# Patient Record
Sex: FEMALE | Race: AMERICAN INDIAN OR ALASKA NATIVE | NOT HISPANIC OR LATINO | Employment: UNEMPLOYED | URBAN - METROPOLITAN AREA
[De-identification: names, ages, dates, MRNs, and addresses within clinical notes are randomized per-mention and may not be internally consistent; named-entity substitution may affect disease eponyms.]

---

## 2022-08-04 LAB
ABO + RH BLD: NORMAL
C TRACH RRNA SPEC QL PROBE: NEGATIVE
GLUCOSE SERPL-MCNC: 170 MG/DL
HBV SURFACE AG SERPL QL IA: NEGATIVE
HCT VFR BLD AUTO: 35.4 % (ref 36–46)
HEMOGLOBIN BANDS: NORMAL
HGB BLD-MCNC: 11.8 G/DL (ref 12–16)
HIV 1+2 AB+HIV1 P24 AG SERPL QL IA: NORMAL
INDIRECT COOMBS: NEGATIVE
MCV RBC AUTO: 95 FL (ref 82–108)
N GONORRHOEAE, AMPLIFIED DNA: NEGATIVE
PLATELET # BLD AUTO: 177 K/UL (ref 150–399)
RPR: NORMAL
RUBELLA IMMUNE STATUS: NORMAL
URINE CULTURE, ROUTINE: NO GROWTH

## 2022-08-10 DIAGNOSIS — Z34.00 SUPERVISION OF NORMAL FIRST PREGNANCY, ANTEPARTUM: Primary | ICD-10-CM

## 2022-08-22 ENCOUNTER — PROCEDURE VISIT (OUTPATIENT)
Dept: MATERNAL FETAL MEDICINE | Facility: CLINIC | Age: 37
End: 2022-08-22
Payer: MEDICAID

## 2022-08-22 DIAGNOSIS — Z34.00 SUPERVISION OF NORMAL FIRST PREGNANCY, ANTEPARTUM: ICD-10-CM

## 2022-08-22 DIAGNOSIS — Z36.89 ENCOUNTER FOR FETAL ANATOMIC SURVEY: Primary | ICD-10-CM

## 2022-08-22 PROCEDURE — 76801 OB US < 14 WKS SINGLE FETUS: CPT | Mod: PBBFAC | Performed by: OBSTETRICS & GYNECOLOGY

## 2022-08-22 PROCEDURE — 76801 US MFM PROCEDURE (VIEWPOINT): ICD-10-PCS | Mod: 26,S$PBB,, | Performed by: OBSTETRICS & GYNECOLOGY

## 2022-08-25 LAB
GLUCOSE, 1 HOUR: 198 MG/DL
GLUCOSE, 2 HOUR: 153 MG/DL
GLUCOSE, 3 HOUR: 120 MG/DL
GLUCOSE, FASTING: 96 MG/DL

## 2022-09-19 ENCOUNTER — INITIAL PRENATAL (OUTPATIENT)
Dept: OBSTETRICS AND GYNECOLOGY | Facility: CLINIC | Age: 37
End: 2022-09-19
Payer: MEDICAID

## 2022-09-19 VITALS — WEIGHT: 196 LBS | HEART RATE: 95 BPM | DIASTOLIC BLOOD PRESSURE: 65 MMHG | SYSTOLIC BLOOD PRESSURE: 107 MMHG

## 2022-09-19 DIAGNOSIS — O09.512 PRIMIGRAVIDA OF ADVANCED MATERNAL AGE IN SECOND TRIMESTER: ICD-10-CM

## 2022-09-19 DIAGNOSIS — O24.410 DIET CONTROLLED GESTATIONAL DIABETES MELLITUS (GDM) IN SECOND TRIMESTER: ICD-10-CM

## 2022-09-19 PROBLEM — O09.519 ADVANCED MATERNAL AGE, PRIMIGRAVIDA, ANTEPARTUM: Status: ACTIVE | Noted: 2022-09-19

## 2022-09-19 PROBLEM — Z28.39 RUBELLA NON-IMMUNE STATUS, ANTEPARTUM: Status: ACTIVE | Noted: 2022-09-19

## 2022-09-19 PROBLEM — O09.899 RUBELLA NON-IMMUNE STATUS, ANTEPARTUM: Status: ACTIVE | Noted: 2022-09-19

## 2022-09-19 PROCEDURE — 99999 PR PBB SHADOW E&M-EST. PATIENT-LVL III: CPT | Mod: PBBFAC,,, | Performed by: OBSTETRICS & GYNECOLOGY

## 2022-09-19 PROCEDURE — 99213 OFFICE O/P EST LOW 20 MIN: CPT | Mod: PBBFAC,TH,PN | Performed by: OBSTETRICS & GYNECOLOGY

## 2022-09-19 PROCEDURE — 99204 PR OFFICE/OUTPT VISIT, NEW, LEVL IV, 45-59 MIN: ICD-10-PCS | Mod: TH,S$PBB,, | Performed by: OBSTETRICS & GYNECOLOGY

## 2022-09-19 PROCEDURE — 99999 PR PBB SHADOW E&M-EST. PATIENT-LVL III: ICD-10-PCS | Mod: PBBFAC,,, | Performed by: OBSTETRICS & GYNECOLOGY

## 2022-09-19 PROCEDURE — 99204 OFFICE O/P NEW MOD 45 MIN: CPT | Mod: TH,S$PBB,, | Performed by: OBSTETRICS & GYNECOLOGY

## 2022-09-19 RX ORDER — BLOOD-GLUCOSE METER
EACH MISCELLANEOUS 4 TIMES DAILY
Status: ON HOLD | COMMUNITY
Start: 2022-08-31 | End: 2023-02-28

## 2022-09-19 RX ORDER — ISOPROPYL ALCOHOL 0.75 G/1
SWAB TOPICAL
Status: ON HOLD | COMMUNITY
Start: 2022-08-31 | End: 2023-02-28

## 2022-09-19 RX ORDER — ONDANSETRON 8 MG/1
TABLET, ORALLY DISINTEGRATING ORAL
Status: ON HOLD | COMMUNITY
Start: 2022-08-04 | End: 2023-02-28

## 2022-09-19 RX ORDER — CALCIUM CARB/VITAMIN D3/VIT K1 500-500-40
TABLET,CHEWABLE ORAL 4 TIMES DAILY
Status: ON HOLD | COMMUNITY
Start: 2022-08-31 | End: 2023-02-28

## 2022-09-19 RX ORDER — LORATADINE 10 MG/1
10 TABLET ORAL DAILY
COMMUNITY

## 2022-09-19 RX ORDER — CALCIUM CITRATE/VITAMIN D3 200MG-6.25
TABLET ORAL 4 TIMES DAILY
Status: ON HOLD | COMMUNITY
Start: 2022-08-31 | End: 2023-02-28

## 2022-09-19 NOTE — PROGRESS NOTES
Pregnancy dating, labs, ultrasound reports, prenatal testing, and problem list; prior records and results; and available outside records were reviewed and updated in EMR.  Pertinent findings were noted below.    Reason for Visit: Initial Prenatal Visit    15w5d by Estimated Date of Delivery: 3/8/23    Blood pressure 107/65, pulse 95, weight 88.9 kg (195 lb 15.8 oz).    Primigravida of advanced maternal age in second trimester  -     Maternal Screen AFP (Single Marker); Future; Expected date: 09/19/2022  -     Connected MOM Enrollment  -     Assign Connected MOM Program Consent Questionnaire    Diet controlled gestational diabetes mellitus (GDM) in second trimester  -     Hemoglobin A1C; Future; Expected date: 09/19/2022       Transfer of care from [place].  Outside labs reviewed and entered in local EMR.  No cramping.  Bleeding on 9/16.  Pt had subchorionic hemorrhage on u/s previously.  +FHTs on doptone today..  Not yet feeling fetal movement.  Anatomy u/s scheduled.  Desires aneuploidy screening; will schedule blood draw for [id number] and AFP.  Connected MOM ordered.   Abnormal early 1-hr and 3-hr glucose screens.   o Pt has been checking pattern blood sugars: reviewed and no indication for meds at this time.  o Continue pattern blood sugars  o A1c  o Referral to MFM and DM education    Pain and bleeding precautions given  Follow-up: 4 weeks

## 2022-09-26 ENCOUNTER — PROCEDURE VISIT (OUTPATIENT)
Dept: OBSTETRICS AND GYNECOLOGY | Facility: CLINIC | Age: 37
End: 2022-09-26
Payer: MEDICAID

## 2022-09-26 DIAGNOSIS — O24.410 DIET CONTROLLED GESTATIONAL DIABETES MELLITUS (GDM) IN SECOND TRIMESTER: ICD-10-CM

## 2022-09-26 LAB
ESTIMATED AVG GLUCOSE: 94 MG/DL (ref 68–131)
HBA1C MFR BLD: 4.9 % (ref 4–5.6)

## 2022-09-26 PROCEDURE — 82105 ALPHA-FETOPROTEIN SERUM: CPT | Performed by: OBSTETRICS & GYNECOLOGY

## 2022-09-26 PROCEDURE — 83036 HEMOGLOBIN GLYCOSYLATED A1C: CPT | Performed by: OBSTETRICS & GYNECOLOGY

## 2022-09-26 NOTE — PROGRESS NOTES
Lab Documentation:    Order Type: Written Order placed in [facility]    Patient in for lab visit only per provider treatment plan.

## 2022-09-29 LAB
# FETUSES US: NORMAL
AFP INTERPRETATION: NORMAL
AFP MOM SERPL: 0.93
AFP SERPL-MCNC: 28 NG/ML
AFP SERPL-MCNC: NEGATIVE NG/ML
AGE AT DELIVERY: 37
GA (DAYS): 5 D
GA (WEEKS): 16 WK
GESTATIONAL AGE METHOD: NORMAL
IDDM PATIENT QL: NORMAL
SMOKING STATUS FTND: NO

## 2022-10-11 ENCOUNTER — PATIENT MESSAGE (OUTPATIENT)
Dept: ADMINISTRATIVE | Facility: OTHER | Age: 37
End: 2022-10-11
Payer: MEDICAID

## 2022-10-11 ENCOUNTER — PATIENT MESSAGE (OUTPATIENT)
Dept: MATERNAL FETAL MEDICINE | Facility: CLINIC | Age: 37
End: 2022-10-11
Payer: MEDICAID

## 2022-10-12 ENCOUNTER — PROCEDURE VISIT (OUTPATIENT)
Dept: MATERNAL FETAL MEDICINE | Facility: CLINIC | Age: 37
End: 2022-10-12
Payer: MEDICAID

## 2022-10-12 ENCOUNTER — PATIENT MESSAGE (OUTPATIENT)
Dept: MATERNAL FETAL MEDICINE | Facility: CLINIC | Age: 37
End: 2022-10-12

## 2022-10-12 ENCOUNTER — RESEARCH ENCOUNTER (OUTPATIENT)
Dept: RESEARCH | Facility: OTHER | Age: 37
End: 2022-10-12
Payer: MEDICAID

## 2022-10-12 DIAGNOSIS — Z36.2 ENCOUNTER FOR FOLLOW-UP ULTRASOUND OF FETAL ANATOMY: Primary | ICD-10-CM

## 2022-10-12 DIAGNOSIS — Z36.89 ENCOUNTER FOR FETAL ANATOMIC SURVEY: ICD-10-CM

## 2022-10-12 PROCEDURE — 76811 OB US DETAILED SNGL FETUS: CPT | Mod: PBBFAC | Performed by: OBSTETRICS & GYNECOLOGY

## 2022-10-12 PROCEDURE — 76811 US MFM PROCEDURE (VIEWPOINT): ICD-10-PCS | Mod: 26,S$PBB,, | Performed by: OBSTETRICS & GYNECOLOGY

## 2022-10-12 NOTE — RESEARCH
"  Screening Visit  Sponsor: [employer] "Miracle of Life Study"  Study: Observational Study of Pregnant Women to Validate Biomarkers of Pregnancy Complication Risk  IRB/Protocol #: 2021.103/ [id number]  Principle Investigator/ Sub-Investigator: Chico Baker MD  Site: [facility]  Location: [facility], [place], operated by [facility]  Subject Number: [id number]     Subject presented for enrollment in [employer]/ [employer] of Life Study.  Subject is  19 weeks 0 days pregnant.  Subject meets all eligibility criteria for enrollment in study.     Prior to the Informed Consent (IC) being signed, or any study protocol required data collection, testing, procedure, or intervention being performed, the following was done and/or discussed:?   Patient was given a copy of the IC for review??   Purpose of the study and qualifications to participate??   Study design, follow up schedule  Confidentiality and HIPAA Authorization for Release of Medical Records for the research trial/ subject's rights/research related injury?   Risk, Benefits, Compensation and Costs?   Participation in the research trial is voluntary and patient may withdraw at anytime?   Contact information for study related questions?      Patient verbalizes understanding of the above: Yes?   Contact information for CRC and PI given to patient: Yes?   Patient able to adequately summarize: the purpose of the study, the risks associated with the study, and all procedures, and follow-ups associated with the study: Yes?      Informed Consent:   Consenting was completed in private area using the most current IRB approved version of the ICF (Revised 10 Sep 2021) and patient was given ample time to review consent prior to execution of ICF.  Before signing consent, patient was asked if she had any questions and she stated "no".   Gifty Hernandez signed the IRB approved version of informed consent form for the [employer]/ Miracle of Life research study.? Each page of the consent was reviewed with the patient and all " questions answered satisfactorily. The patient signed the paper consent form and received a copy of same (copy of informed consent made and provided to patient). The original consent was scanned into electronic medical records (EPIC).    Time of Consent Execution: 9:00 am      Blood Sample Collection:      Was the sample collected?: Yes  Sample collected by: Nighat Philippe  Date of collection: 12/OCT/2022  Time of collection: 9:09 AM  Specimen Type: whole blood  Specimen shipped Fed EX Ref # [id number] Shipped on 13/OCT/2022     Sample ID #: [id number]     Patient advised we would continue to follow her throughout her pregnancy and would follow up with her after her delivery, Patient verbalized understanding.     Patient received [employer] Card Token # [id number].  Research participant received $25 stipend.

## 2022-10-17 ENCOUNTER — ROUTINE PRENATAL (OUTPATIENT)
Dept: OBSTETRICS AND GYNECOLOGY | Facility: CLINIC | Age: 37
End: 2022-10-17
Payer: MEDICAID

## 2022-10-17 VITALS — SYSTOLIC BLOOD PRESSURE: 117 MMHG | WEIGHT: 195.13 LBS | DIASTOLIC BLOOD PRESSURE: 68 MMHG

## 2022-10-17 DIAGNOSIS — O24.410 DIET CONTROLLED GESTATIONAL DIABETES MELLITUS (GDM) IN SECOND TRIMESTER: Primary | ICD-10-CM

## 2022-10-17 DIAGNOSIS — O09.92 SUPERVISION OF HIGH RISK PREGNANCY IN SECOND TRIMESTER: ICD-10-CM

## 2022-10-17 PROCEDURE — 99999 PR PBB SHADOW E&M-EST. PATIENT-LVL II: CPT | Mod: PBBFAC,,, | Performed by: ADVANCED PRACTICE MIDWIFE

## 2022-10-17 PROCEDURE — 99213 PR OFFICE/OUTPT VISIT, EST, LEVL III, 20-29 MIN: ICD-10-PCS | Mod: TH,S$PBB,, | Performed by: ADVANCED PRACTICE MIDWIFE

## 2022-10-17 PROCEDURE — 99999 PR PBB SHADOW E&M-EST. PATIENT-LVL II: ICD-10-PCS | Mod: PBBFAC,,, | Performed by: ADVANCED PRACTICE MIDWIFE

## 2022-10-17 PROCEDURE — 99213 OFFICE O/P EST LOW 20 MIN: CPT | Mod: TH,S$PBB,, | Performed by: ADVANCED PRACTICE MIDWIFE

## 2022-10-17 PROCEDURE — 99212 OFFICE O/P EST SF 10 MIN: CPT | Mod: PBBFAC,TH,PN | Performed by: ADVANCED PRACTICE MIDWIFE

## 2022-10-18 ENCOUNTER — PATIENT MESSAGE (OUTPATIENT)
Dept: MATERNAL FETAL MEDICINE | Facility: CLINIC | Age: 37
End: 2022-10-18
Payer: MEDICAID

## 2022-10-22 PROBLEM — O09.92 SUPERVISION OF HIGH RISK PREGNANCY IN SECOND TRIMESTER: Status: ACTIVE | Noted: 2022-10-22

## 2022-10-22 NOTE — PROGRESS NOTES
Reason for visit: Routine Prenatal Visit      HPI:   37 y.o., at 20w3d by Estimated Date of Delivery: 3/8/23    In with no c.o    - Contractions: denies  - Bleeding: denies  - Loss of fluid: denies  - Fetal movement: reports slight mvmt  - Nausea: no  - Vomiting: no  - Headache: no      Reviewed:    Past medical, surgical, social, family, and obstetric history: Reviewed and updated in EMR.  Medications: Reviewed and updated in EMR.  Allergies: Penicillins    Pregnancy dating, labs, ultrasound reports, prenatal testing, and problem list: Reviewed and updated in EMR.  Outside records: na  Independent interpretation of tests: na  Discussion with another healthcare professional: Reviewed glucose values with Dr. Echevarria- all within range      Vitals: /68   Wt 88.5 kg (195 lb 1.7 oz)     Physical exam:  GENERAL: No acute distress  ABD: Gravid      Assessment and Plan:    Diet controlled gestational diabetes mellitus (GDM) in second trimester  -     Ambulatory referral/consult to Perinatology; Future; Expected date: 10/25/2022    Supervision of high risk pregnancy in second trimester        Lor f/u sched     labor precautions given  Follow-up:4 weeks      I spent a total of 20 minutes on the day of the visit. This includes face to face time and non-face to face time preparing to see the patient (eg, review of tests), Obtaining and/or reviewing separately obtained history, Documenting clinical information in the electronic or other health record, Independently interpreting results and communicating results to the patient/family/caregiver, or Care coordination.

## 2022-10-24 ENCOUNTER — TELEPHONE (OUTPATIENT)
Dept: OBSTETRICS AND GYNECOLOGY | Facility: CLINIC | Age: 37
End: 2022-10-24
Payer: MEDICAID

## 2022-10-24 DIAGNOSIS — Z34.82 ENCOUNTER FOR SUPERVISION OF OTHER NORMAL PREGNANCY IN SECOND TRIMESTER: Primary | ICD-10-CM

## 2022-10-26 ENCOUNTER — PROCEDURE VISIT (OUTPATIENT)
Dept: OBSTETRICS AND GYNECOLOGY | Facility: CLINIC | Age: 37
End: 2022-10-26
Payer: MEDICAID

## 2022-10-26 DIAGNOSIS — Z34.82 ENCOUNTER FOR SUPERVISION OF OTHER NORMAL PREGNANCY IN SECOND TRIMESTER: ICD-10-CM

## 2022-10-26 PROCEDURE — 82105 ALPHA-FETOPROTEIN SERUM: CPT | Performed by: ADVANCED PRACTICE MIDWIFE

## 2022-10-26 NOTE — PROGRESS NOTES
Lab Documentation:    Order Type: Written Order placed in [facility]    Patient in for lab visit only per provider treatment plan.

## 2022-10-31 LAB
# FETUSES US: NORMAL
AFP INTERPRETATION: NORMAL
AFP MOM SERPL: 1.25
AFP SERPL-MCNC: 67.1 NG/ML
AFP SERPL-MCNC: NEGATIVE NG/ML
AGE AT DELIVERY: 37
GA (DAYS): 0 D
GA (WEEKS): 21 WK
GESTATIONAL AGE METHOD: NORMAL
IDDM PATIENT QL: NORMAL
SMOKING STATUS FTND: NO

## 2022-11-10 ENCOUNTER — OFFICE VISIT (OUTPATIENT)
Dept: MATERNAL FETAL MEDICINE | Facility: CLINIC | Age: 37
End: 2022-11-10
Payer: MEDICAID

## 2022-11-10 ENCOUNTER — PATIENT MESSAGE (OUTPATIENT)
Dept: MATERNAL FETAL MEDICINE | Facility: CLINIC | Age: 37
End: 2022-11-10

## 2022-11-10 DIAGNOSIS — O24.410 DIET CONTROLLED GESTATIONAL DIABETES MELLITUS (GDM) IN SECOND TRIMESTER: Primary | ICD-10-CM

## 2022-11-10 PROCEDURE — 99214 OFFICE O/P EST MOD 30 MIN: CPT | Mod: TH,95,, | Performed by: OBSTETRICS & GYNECOLOGY

## 2022-11-10 PROCEDURE — 3044F HG A1C LEVEL LT 7.0%: CPT | Mod: CPTII,95,, | Performed by: OBSTETRICS & GYNECOLOGY

## 2022-11-10 PROCEDURE — 3044F PR MOST RECENT HEMOGLOBIN A1C LEVEL <7.0%: ICD-10-PCS | Mod: CPTII,95,, | Performed by: OBSTETRICS & GYNECOLOGY

## 2022-11-10 PROCEDURE — 99214 PR OFFICE/OUTPT VISIT, EST, LEVL IV, 30-39 MIN: ICD-10-PCS | Mod: TH,95,, | Performed by: OBSTETRICS & GYNECOLOGY

## 2022-11-10 NOTE — PROGRESS NOTES
MATERNAL-FETAL MEDICINE   CONSULT NOTE    Provider requesting consultation: Ric Aguilar MD     SUBJECTIVE:     Ms. Gifty Hernandez is a 37 y.o.  female with IUP at 23w1d who is seen in consultation by MFM for evaluation and management of:  Gestational diabetes          Medication List with Changes/Refills   Current Medications    BD ALCOHOL SWABS PADM    use as directed    LORATADINE (CLARITIN) 10 MG TABLET    Take 10 mg by mouth once daily.    ONDANSETRON (ZOFRAN-ODT) 8 MG TBDL    Take by mouth.    SUPER THIN LANCETS 28 GAUGE MISC    4 (four) times daily. Test    TRUE METRIX GLUCOSE METER MISC    4 (four) times daily. Test    TRUE METRIX GLUCOSE TEST STRIP STRP    4 (four) times daily. Test       Review of patient's allergies indicates:   Allergen Reactions    Penicillins Other (See Comments)       PMH:  Past Medical History:   Diagnosis Date    Diabetes mellitus        PObHx:  OB History    Para Term  AB Living   1             SAB IAB Ectopic Multiple Live Births                  # Outcome Date GA Lbr Ricardo/2nd Weight Sex Delivery Anes PTL Lv   1 Current                PSH:negative     Social history: reports that she has never smoked. She has never used smokeless tobacco. She reports that she does not currently use alcohol. She reports that she does not use drugs.    Genetic history:  The patient denies any inherited genetic diseases or birth defects in herself or her partner's personal history or family.    Objective:   Virtual Visit             ASSESSMENT/PLAN:     37 y.o.  female with IUP at 23w1d     Gestational Diabetes  I counseled the patient regarding the risks of gestational diabetes, which include macrosomia, hypertensive disorders of pregnancy,  hypoglycemia, shoulder dystocia/birth trauma, polyhydramnios, and increased risk of  delivery.  Patients requiring medical therapy have an increased risk of stillbirth.  Discussed that  outcome is linked to her  ability to achieve glycemic control.  The goal of treatment is to have a fasting blood sugar between 70 and 95 mg/dL and 2 hour postprandials less than 120 mg/dL.  Therapy will likely consist of therapy with metformin or insulin. Approximately 30-50% of the time, women who are well-controlled on metformin may require the addition of insulin as the pregnancy progresses. Glyburide therapy has demonstrated inferior results as compared to metformin and insulin, and therefore, is not a first-line choice. Antepartum testing is indicated for those patients requiring medical therapy to reduce the incidence of fetal demise. We discussed dietary counseling and appropriate exercise to improve peripheral insulin resistance. We discussed the frequency of blood sugar monitoring and goal blood sugars. She has not  met with a diabetic educator this pregnancy. I would recommend obtaining serial growth ultrasounds in the third trimester to monitor for macrosomia.    At present, the patients BS are normal.      Most women with GDM are cured by delivery. All medications can be stopped postpartum. However, some women with GDM have undiagnosed type 2 diabetes. Therefore, I recommend obtaining a postpartum fasting blood sugar prior to discharge. Approximately 20% of women with GDM will have glucose intolerance or type 2 DM at the postpartum visit. A 2 hour glucose tolerance test should be performed 6-8 weeks postpartum. If the patient is breastfeeding, it is reasonable to delay this as appropriate. Additionally, women with GDM are at increased risk of developing type 2 DM later in life. Therefore, establishing with a primary MD who can perform annual diabetes screening is appropriate.     Recommendations:   We reinforced checking 4 x/day and reinforced goal blood sugars   BS are normal, no medication is needed.  Primary OB may follow BS.  Please reach out to us if BS are abnormal and therapy is needed.   If medications are required,  recommend growth scans every 4-6 weeks, starting at 28 weeks gestation  If medications are required, recommend starting antepartum testing at 32 weeks gestation (weekly NST+AFV or BPP); twice weekly testing is recommended if blood sugars are poorly controlled.   -Antepartum testing should be started at 40 weeks for women who do NOT require medications.  Check fasting blood sugar in hospital postpartum.   If normal, discontinue therapy and monitoring and recommend repeat postpartum glucose testing in 6-8 weeks postpartum using a 75 g oral glucose tolerance test.   If fasting blood glucose is between 100-125 mg/dl or impaired glucose tolerance is noted on 2 hour glucose test, refer to primary care as appropriate.   An ultrasound for estimated fetal weight should be obtained within 3 weeks of anticipated delivery; if the EFW is >= 4500 grams, a  should be offered.    Delivery timing:  Well controlled with diet: 39 0/7 - 40 6/7 weeks gestation  Oral agent or insulin required: 39 0/7 - 39 6/ 7 weeks gestation  Poorly controlled on oral agent or insulin: 38 0/7 - 38 6/7 weeks gestation         FOLLOW UP:   NO additional MFM visits are needed  Primary OB to follow PBS  Serial US are recommended as above      The patient location is: Home, kitchen  The chief complaint leading to consultation is: A1DM  Visit type: audiovisual  Face to Face time with patient: 15  30 minutes of total time spent on the encounter, which includes face to face time and non-face to face time preparing to see the patient (eg, review of tests), Obtaining and/or reviewing separately obtained history, Documenting clinical information in the electronic or other health record, Independently interpreting results (not separately reported) and communicating results to the patient/family/caregiver, or Care coordination (not separately reported).   Each patient to whom he or she provides medical services by telemedicine is:  (1) informed of the  relationship between the physician and patient and the respective role of any other health care provider with respect to management of the patient; and (2) notified that he or she may decline to receive medical services by telemedicine and may withdraw from such care at any time.          Freida Yusuf  Maternal-Fetal Medicine    Electronically Signed by Freida Yusuf November 10, 2022

## 2022-11-11 ENCOUNTER — PATIENT MESSAGE (OUTPATIENT)
Dept: MATERNAL FETAL MEDICINE | Facility: CLINIC | Age: 37
End: 2022-11-11
Payer: MEDICAID

## 2022-11-14 ENCOUNTER — PROCEDURE VISIT (OUTPATIENT)
Dept: MATERNAL FETAL MEDICINE | Facility: CLINIC | Age: 37
End: 2022-11-14
Payer: MEDICAID

## 2022-11-14 ENCOUNTER — ROUTINE PRENATAL (OUTPATIENT)
Dept: OBSTETRICS AND GYNECOLOGY | Facility: CLINIC | Age: 37
End: 2022-11-14
Payer: MEDICAID

## 2022-11-14 VITALS — WEIGHT: 198.63 LBS | SYSTOLIC BLOOD PRESSURE: 100 MMHG | HEART RATE: 71 BPM | DIASTOLIC BLOOD PRESSURE: 62 MMHG

## 2022-11-14 DIAGNOSIS — Z36.2 ENCOUNTER FOR FOLLOW-UP ULTRASOUND OF FETAL ANATOMY: ICD-10-CM

## 2022-11-14 DIAGNOSIS — O09.512 PRIMIGRAVIDA OF ADVANCED MATERNAL AGE IN SECOND TRIMESTER: Primary | ICD-10-CM

## 2022-11-14 DIAGNOSIS — Z23 NEEDS FLU SHOT: ICD-10-CM

## 2022-11-14 DIAGNOSIS — O24.410 DIET CONTROLLED GESTATIONAL DIABETES MELLITUS (GDM) IN SECOND TRIMESTER: ICD-10-CM

## 2022-11-14 PROCEDURE — 76816 OB US FOLLOW-UP PER FETUS: CPT | Mod: PBBFAC | Performed by: OBSTETRICS & GYNECOLOGY

## 2022-11-14 PROCEDURE — 99213 OFFICE O/P EST LOW 20 MIN: CPT | Mod: PBBFAC,TH,PN | Performed by: OBSTETRICS & GYNECOLOGY

## 2022-11-14 PROCEDURE — 76816 US MFM PROCEDURE (VIEWPOINT): ICD-10-PCS | Mod: 26,S$PBB,, | Performed by: OBSTETRICS & GYNECOLOGY

## 2022-11-14 PROCEDURE — 90471 IMMUNIZATION ADMIN: CPT | Mod: PBBFAC,PN

## 2022-11-14 PROCEDURE — 99213 PR OFFICE/OUTPT VISIT, EST, LEVL III, 20-29 MIN: ICD-10-PCS | Mod: TH,S$PBB,, | Performed by: OBSTETRICS & GYNECOLOGY

## 2022-11-14 PROCEDURE — 99999 PR PBB SHADOW E&M-EST. PATIENT-LVL III: CPT | Mod: PBBFAC,,, | Performed by: OBSTETRICS & GYNECOLOGY

## 2022-11-14 PROCEDURE — 99213 OFFICE O/P EST LOW 20 MIN: CPT | Mod: TH,S$PBB,, | Performed by: OBSTETRICS & GYNECOLOGY

## 2022-11-14 PROCEDURE — 99999 PR PBB SHADOW E&M-EST. PATIENT-LVL III: ICD-10-PCS | Mod: PBBFAC,,, | Performed by: OBSTETRICS & GYNECOLOGY

## 2022-11-17 NOTE — PROGRESS NOTES
Pregnancy dating, labs, ultrasound reports, prenatal testing, and problem list; prior records and results; and available outside records were reviewed and updated in EMR.  Pertinent findings were noted below.    Reason for Visit: Routine Prenatal Visit    23w5d by Estimated Date of Delivery: 3/8/23    Blood pressure 100/62, pulse 71, weight 90.1 kg (198 lb 10.2 oz).    Primigravida of advanced maternal age in second trimester    Diet controlled gestational diabetes mellitus (GDM) in second trimester    Needs flu shot  -     Influenza - Quadrivalent *Preferred* (6 months+) (PF)       No contractions, bleeding, or loss of fluid.  +fetal movement.  Labs reviewed and up to date.  Normal anatomy on u/s.  AFP (-).   Blood sugars are normal.  Continue PBS.  No indication for meds at this time.   Flu vaccine today.     labor precautions given  Follow-up: 4 weeks

## 2022-11-23 ENCOUNTER — PATIENT MESSAGE (OUTPATIENT)
Dept: ADMINISTRATIVE | Facility: OTHER | Age: 37
End: 2022-11-23
Payer: MEDICAID

## 2022-12-13 ENCOUNTER — PROCEDURE VISIT (OUTPATIENT)
Dept: OBSTETRICS AND GYNECOLOGY | Facility: CLINIC | Age: 37
End: 2022-12-13
Payer: MEDICAID

## 2022-12-13 ENCOUNTER — ROUTINE PRENATAL (OUTPATIENT)
Dept: OBSTETRICS AND GYNECOLOGY | Facility: CLINIC | Age: 37
End: 2022-12-13
Attending: OBSTETRICS & GYNECOLOGY
Payer: MEDICAID

## 2022-12-13 VITALS — DIASTOLIC BLOOD PRESSURE: 62 MMHG | WEIGHT: 204.38 LBS | SYSTOLIC BLOOD PRESSURE: 110 MMHG

## 2022-12-13 DIAGNOSIS — Z23 NEED FOR TDAP VACCINATION: ICD-10-CM

## 2022-12-13 DIAGNOSIS — O09.92 SUPERVISION OF HIGH RISK PREGNANCY IN SECOND TRIMESTER: ICD-10-CM

## 2022-12-13 DIAGNOSIS — K21.9 GASTROESOPHAGEAL REFLUX DISEASE WITHOUT ESOPHAGITIS: ICD-10-CM

## 2022-12-13 DIAGNOSIS — O09.92 SUPERVISION OF HIGH RISK PREGNANCY IN SECOND TRIMESTER: Primary | ICD-10-CM

## 2022-12-13 PROBLEM — J45.909 ASTHMA: Status: ACTIVE | Noted: 2021-07-15

## 2022-12-13 LAB
BASOPHILS # BLD AUTO: 0.03 K/UL (ref 0–0.2)
BASOPHILS NFR BLD: 0.3 % (ref 0–1.9)
DIFFERENTIAL METHOD: ABNORMAL
EOSINOPHIL # BLD AUTO: 0.2 K/UL (ref 0–0.5)
EOSINOPHIL NFR BLD: 1.5 % (ref 0–8)
ERYTHROCYTE [DISTWIDTH] IN BLOOD BY AUTOMATED COUNT: 12.9 % (ref 11.5–14.5)
HCT VFR BLD AUTO: 36.1 % (ref 37–48.5)
HGB BLD-MCNC: 11.6 G/DL (ref 12–16)
IMM GRANULOCYTES # BLD AUTO: 0.07 K/UL (ref 0–0.04)
IMM GRANULOCYTES NFR BLD AUTO: 0.7 % (ref 0–0.5)
LYMPHOCYTES # BLD AUTO: 1.9 K/UL (ref 1–4.8)
LYMPHOCYTES NFR BLD: 18.9 % (ref 18–48)
MCH RBC QN AUTO: 32 PG (ref 27–31)
MCHC RBC AUTO-ENTMCNC: 32.1 G/DL (ref 32–36)
MCV RBC AUTO: 99 FL (ref 82–98)
MONOCYTES # BLD AUTO: 0.5 K/UL (ref 0.3–1)
MONOCYTES NFR BLD: 4.7 % (ref 4–15)
NEUTROPHILS # BLD AUTO: 7.6 K/UL (ref 1.8–7.7)
NEUTROPHILS NFR BLD: 73.9 % (ref 38–73)
NRBC BLD-RTO: 0 /100 WBC
PLATELET # BLD AUTO: 168 K/UL (ref 150–450)
PMV BLD AUTO: 12.2 FL (ref 9.2–12.9)
RBC # BLD AUTO: 3.63 M/UL (ref 4–5.4)
WBC # BLD AUTO: 10.27 K/UL (ref 3.9–12.7)

## 2022-12-13 PROCEDURE — 90715 TDAP VACCINE 7 YRS/> IM: CPT | Mod: PBBFAC,PN

## 2022-12-13 PROCEDURE — 99213 PR OFFICE/OUTPT VISIT, EST, LEVL III, 20-29 MIN: ICD-10-PCS | Mod: TH,S$PBB,, | Performed by: OBSTETRICS & GYNECOLOGY

## 2022-12-13 PROCEDURE — 99213 OFFICE O/P EST LOW 20 MIN: CPT | Mod: TH,S$PBB,, | Performed by: OBSTETRICS & GYNECOLOGY

## 2022-12-13 PROCEDURE — 99999 PR PBB SHADOW E&M-EST. PATIENT-LVL III: CPT | Mod: PBBFAC,,, | Performed by: OBSTETRICS & GYNECOLOGY

## 2022-12-13 PROCEDURE — 99999 PR PBB SHADOW E&M-EST. PATIENT-LVL III: ICD-10-PCS | Mod: PBBFAC,,, | Performed by: OBSTETRICS & GYNECOLOGY

## 2022-12-13 PROCEDURE — 99213 OFFICE O/P EST LOW 20 MIN: CPT | Mod: PBBFAC,TH,PN | Performed by: OBSTETRICS & GYNECOLOGY

## 2022-12-13 PROCEDURE — 85025 COMPLETE CBC W/AUTO DIFF WBC: CPT

## 2022-12-13 RX ORDER — FAMOTIDINE 20 MG/1
20 TABLET, FILM COATED ORAL 2 TIMES DAILY
Qty: 60 TABLET | Refills: 1 | Status: SHIPPED | OUTPATIENT
Start: 2022-12-13 | End: 2023-04-05

## 2022-12-13 NOTE — PROGRESS NOTES
Lab Documentation:    Order Type: Written Order placed in [facility]    Patient in for lab visit only per provider treatment plan.

## 2022-12-13 NOTE — PROGRESS NOTES
Pregnancy dating, labs, ultrasound reports, prenatal testing, and problem list; prior records and results; and available outside records were reviewed and updated in EMR.  Pertinent findings were noted below.    Reason for Visit   Routine Prenatal Visit    HPI   37 y.o., at 27w6d by Estimated Date of Delivery: 3/8/23    Reports hip pain and back pain. Endorses increased heart burn. No other concerns today.     Contractions: No   Bleeding: No   Loss of fluid: No   Fetal movement: Yes   Nausea: No   Vomiting: No   Headache: Yes, takes tylenol which resolves it     Exam   /62   Wt 92.7 kg (204 lb 5.9 oz)     TW lbs  GENERAL: No acute distress  ABD: Gravid    BG Logs:   Fasting   pB   pL   pD     Assessment and Plan   Need for Tdap vaccination  -     (In Office Administered) Tdap Vaccine    Supervision of high risk pregnancy in second trimester  -     CBC W/ AUTO DIFFERENTIAL; Future; Expected date: 2022    Gastroesophageal reflux disease without esophagitis  -     famotidine (PEPCID) 20 MG tablet; Take 1 tablet (20 mg total) by mouth 2 (two) times daily.  Dispense: 60 tablet; Refill: 1      Continue routine prenatal care.   Good BG levels. No medications indicated at this time for GDM. New BG logs given.   Will receive TDAP and have CBC drawn today.  Provided information on round ligament pain and recommend yoga and pilates during pregnancy for discomfort.   Will further discuss birth control and mode of feeding at next prenatal appointment.      labor precautions given  Follow-up: 2 weeks    Maribell Posadas MD PGY-1  Obstetrics and Gynecology

## 2022-12-14 ENCOUNTER — PATIENT MESSAGE (OUTPATIENT)
Dept: ADMINISTRATIVE | Facility: OTHER | Age: 37
End: 2022-12-14
Payer: MEDICAID

## 2022-12-27 ENCOUNTER — ROUTINE PRENATAL (OUTPATIENT)
Dept: OBSTETRICS AND GYNECOLOGY | Facility: CLINIC | Age: 37
End: 2022-12-27
Payer: MEDICAID

## 2022-12-27 VITALS — DIASTOLIC BLOOD PRESSURE: 59 MMHG | WEIGHT: 205.25 LBS | SYSTOLIC BLOOD PRESSURE: 94 MMHG | HEART RATE: 72 BPM

## 2022-12-27 DIAGNOSIS — Z30.09 ENCOUNTER FOR OTHER GENERAL COUNSELING OR ADVICE ON CONTRACEPTION: ICD-10-CM

## 2022-12-27 DIAGNOSIS — O24.410 DIET CONTROLLED GESTATIONAL DIABETES MELLITUS (GDM) IN THIRD TRIMESTER: ICD-10-CM

## 2022-12-27 DIAGNOSIS — O09.513 PRIMIGRAVIDA OF ADVANCED MATERNAL AGE IN THIRD TRIMESTER: Primary | ICD-10-CM

## 2022-12-27 PROCEDURE — 99213 OFFICE O/P EST LOW 20 MIN: CPT | Mod: TH,S$PBB,, | Performed by: OBSTETRICS & GYNECOLOGY

## 2022-12-27 PROCEDURE — 99999 PR PBB SHADOW E&M-EST. PATIENT-LVL III: CPT | Mod: PBBFAC,,, | Performed by: OBSTETRICS & GYNECOLOGY

## 2022-12-27 PROCEDURE — 99213 PR OFFICE/OUTPT VISIT, EST, LEVL III, 20-29 MIN: ICD-10-PCS | Mod: TH,S$PBB,, | Performed by: OBSTETRICS & GYNECOLOGY

## 2022-12-27 PROCEDURE — 99213 OFFICE O/P EST LOW 20 MIN: CPT | Mod: PBBFAC,TH,PN | Performed by: OBSTETRICS & GYNECOLOGY

## 2022-12-27 PROCEDURE — 99999 PR PBB SHADOW E&M-EST. PATIENT-LVL III: ICD-10-PCS | Mod: PBBFAC,,, | Performed by: OBSTETRICS & GYNECOLOGY

## 2022-12-27 NOTE — PROGRESS NOTES
Pregnancy dating, labs, ultrasound reports, prenatal testing, and problem list; prior records and results; and available outside records were reviewed and updated in EMR.  Pertinent findings were noted below.    Reason for Visit: Routine Prenatal Visit    29w6d by Estimated Date of Delivery: 3/8/23    Blood pressure (!) 94/59, pulse 72, weight 93.1 kg (205 lb 4 oz).    Primigravida of advanced maternal age in third trimester    Diet controlled gestational diabetes mellitus (GDM) in third trimester    Encounter for other general counseling or advice on contraception       No contractions, bleeding, or loss of fluid.  +fetal movement.  Labs reviewed and up to date.   Blood sugars reviewed; controlled with diet.  No indication for meds at this time.  Repeat u/s for growth scheduled.   Counseled on contraceptive options.  Pt undecided.     labor precautions given  Follow-up: 2 weeks

## 2023-01-10 ENCOUNTER — ROUTINE PRENATAL (OUTPATIENT)
Dept: OBSTETRICS AND GYNECOLOGY | Facility: CLINIC | Age: 38
End: 2023-01-10
Payer: MEDICAID

## 2023-01-10 ENCOUNTER — PATIENT MESSAGE (OUTPATIENT)
Dept: MATERNAL FETAL MEDICINE | Facility: CLINIC | Age: 38
End: 2023-01-10
Payer: MEDICAID

## 2023-01-10 VITALS — WEIGHT: 207.25 LBS | DIASTOLIC BLOOD PRESSURE: 64 MMHG | SYSTOLIC BLOOD PRESSURE: 110 MMHG

## 2023-01-10 DIAGNOSIS — O09.513 PRIMIGRAVIDA OF ADVANCED MATERNAL AGE IN THIRD TRIMESTER: Primary | ICD-10-CM

## 2023-01-10 DIAGNOSIS — O24.410 DIET CONTROLLED GESTATIONAL DIABETES MELLITUS (GDM) IN THIRD TRIMESTER: ICD-10-CM

## 2023-01-10 PROCEDURE — 99213 PR OFFICE/OUTPT VISIT, EST, LEVL III, 20-29 MIN: ICD-10-PCS | Mod: TH,S$PBB,, | Performed by: OBSTETRICS & GYNECOLOGY

## 2023-01-10 PROCEDURE — 99999 PR PBB SHADOW E&M-EST. PATIENT-LVL II: ICD-10-PCS | Mod: PBBFAC,,, | Performed by: OBSTETRICS & GYNECOLOGY

## 2023-01-10 PROCEDURE — 99212 OFFICE O/P EST SF 10 MIN: CPT | Mod: PBBFAC,TH,PN | Performed by: OBSTETRICS & GYNECOLOGY

## 2023-01-10 PROCEDURE — 99999 PR PBB SHADOW E&M-EST. PATIENT-LVL II: CPT | Mod: PBBFAC,,, | Performed by: OBSTETRICS & GYNECOLOGY

## 2023-01-10 PROCEDURE — 99213 OFFICE O/P EST LOW 20 MIN: CPT | Mod: TH,S$PBB,, | Performed by: OBSTETRICS & GYNECOLOGY

## 2023-01-10 NOTE — PROGRESS NOTES
Pregnancy dating, labs, ultrasound reports, prenatal testing, and problem list; prior records and results; and available outside records were reviewed and updated in EMR.  Pertinent findings were noted below.    Reason for Visit   Routine Prenatal Visit    HPI   37 y.o., at 31w6d by Estimated Date of Delivery: 3/8/23    Patient reports feeling well overall. BG mostly at goal. Nervous as due date is approaching and she has many things to get done before delivery.    Contractions: No   Bleeding: No   Loss of fluid: No   Fetal movement: Yes   Nausea: No   Vomiting: No   Headache: No     Exam   /64   Wt 94 kg (207 lb 3.7 oz)     TW#  GENERAL: No acute distress  ABD: Gravid    Assessment and Plan   Primigravida of advanced maternal age in third trimester    Diet controlled gestational diabetes mellitus (GDM) in third trimester    Care and examination of lactating mother  -     BREAST PUMP FOR HOME USE         BG log revealed, well controlled with diet/exercise.   Rx for breast pump provided   Growth US scheduled tomorrow    Patient inquiring about birthing classes. Informed [facility] has virtual classes, otherwise she may be able to find others in the community.   IOL discussed, patient interested in IOL around 39wga as her mother is planning on coming from [place] to help with the baby     labor, Pain and bleeding, preE and fetal movement count precautions given  Follow-up: 2 weeks    Lissett Narayan MD  OBGYN, PGY-2    I have reviewed the Resident's progress note.  I have personally interviewed and examined the patient at bedside and agree with the findings as documented.  All patient questions answered.  -- ALEKS Aguilar M.D.

## 2023-01-11 ENCOUNTER — PATIENT MESSAGE (OUTPATIENT)
Dept: OTHER | Facility: OTHER | Age: 38
End: 2023-01-11
Payer: MEDICAID

## 2023-01-11 ENCOUNTER — PROCEDURE VISIT (OUTPATIENT)
Dept: MATERNAL FETAL MEDICINE | Facility: CLINIC | Age: 38
End: 2023-01-11
Payer: MEDICAID

## 2023-01-11 DIAGNOSIS — O24.410 DIET CONTROLLED GESTATIONAL DIABETES MELLITUS (GDM), ANTEPARTUM: ICD-10-CM

## 2023-01-11 DIAGNOSIS — Z36.89 ENCOUNTER FOR ULTRASOUND TO ASSESS FETAL GROWTH: Primary | ICD-10-CM

## 2023-01-11 DIAGNOSIS — Z36.2 ENCOUNTER FOR FOLLOW-UP ULTRASOUND OF FETAL ANATOMY: ICD-10-CM

## 2023-01-11 PROCEDURE — 76816 US MFM PROCEDURE (VIEWPOINT): ICD-10-PCS | Mod: 26,S$PBB,, | Performed by: OBSTETRICS & GYNECOLOGY

## 2023-01-11 PROCEDURE — 76816 OB US FOLLOW-UP PER FETUS: CPT | Mod: PBBFAC | Performed by: OBSTETRICS & GYNECOLOGY

## 2023-01-23 ENCOUNTER — ROUTINE PRENATAL (OUTPATIENT)
Dept: OBSTETRICS AND GYNECOLOGY | Facility: CLINIC | Age: 38
End: 2023-01-23
Payer: MEDICAID

## 2023-01-23 VITALS — DIASTOLIC BLOOD PRESSURE: 67 MMHG | WEIGHT: 212.5 LBS | SYSTOLIC BLOOD PRESSURE: 116 MMHG

## 2023-01-23 DIAGNOSIS — O09.93 PREGNANCY, SUPERVISION, HIGH-RISK, THIRD TRIMESTER: Primary | ICD-10-CM

## 2023-01-23 PROCEDURE — 99999 PR PBB SHADOW E&M-EST. PATIENT-LVL II: ICD-10-PCS | Mod: PBBFAC,,, | Performed by: ADVANCED PRACTICE MIDWIFE

## 2023-01-23 PROCEDURE — 99999 PR PBB SHADOW E&M-EST. PATIENT-LVL II: CPT | Mod: PBBFAC,,, | Performed by: ADVANCED PRACTICE MIDWIFE

## 2023-01-23 PROCEDURE — 99213 PR OFFICE/OUTPT VISIT, EST, LEVL III, 20-29 MIN: ICD-10-PCS | Mod: TH,S$PBB,, | Performed by: ADVANCED PRACTICE MIDWIFE

## 2023-01-23 PROCEDURE — 99213 OFFICE O/P EST LOW 20 MIN: CPT | Mod: TH,S$PBB,, | Performed by: ADVANCED PRACTICE MIDWIFE

## 2023-01-23 PROCEDURE — 99212 OFFICE O/P EST SF 10 MIN: CPT | Mod: PBBFAC,TH,PN | Performed by: ADVANCED PRACTICE MIDWIFE

## 2023-01-23 NOTE — PROGRESS NOTES
Reason for visit: Routine Prenatal Visit      HPI:   37 y.o., at 33w5d by Estimated Date of Delivery: 3/8/23    In with no c/o    - Contractions: denies  - Bleeding: denies  - Loss of fluid: denies  - Fetal movement: reports good FM, reinforced BID  - Nausea: no  - Vomiting: no  - Headache: no      Reviewed:    Past medical, surgical, social, family, and obstetric history: Reviewed and updated in EMR.  Medications: Reviewed and updated in EMR.  Allergies: Penicillins    Pregnancy dating, labs, ultrasound reports, prenatal testing, and problem list: Reviewed and updated in EMR.  Outside records: na  Independent interpretation of tests: na  Discussion with another healthcare professional: reviewed glucose values with Dr. Aguilar- continue with diet controlled      Vitals: /67   Wt 96.4 kg (212 lb 8.4 oz)     Physical exam:  GENERAL: No acute distress  ABD: Gravid      Assessment and Plan:    Pregnancy, supervision, high-risk, third trimester         Glucose values reviewed and scanned- majority WNL- advised note dietary changes   Has growth scan sched 22   Undecided re BCM p delivery     labor precautions given  Follow-up: 2 weeks      I spent a total of 20 minutes on the day of the visit. This includes face to face time and non-face to face time preparing to see the patient (eg, review of tests), Obtaining and/or reviewing separately obtained history, Documenting clinical information in the electronic or other health record, Independently interpreting results and communicating results to the patient/family/caregiver, or Care coordination.

## 2023-02-01 ENCOUNTER — PATIENT MESSAGE (OUTPATIENT)
Dept: OTHER | Facility: OTHER | Age: 38
End: 2023-02-01
Payer: MEDICAID

## 2023-02-07 ENCOUNTER — ROUTINE PRENATAL (OUTPATIENT)
Dept: OBSTETRICS AND GYNECOLOGY | Facility: CLINIC | Age: 38
End: 2023-02-07
Attending: OBSTETRICS & GYNECOLOGY
Payer: MEDICAID

## 2023-02-07 ENCOUNTER — PROCEDURE VISIT (OUTPATIENT)
Dept: OBSTETRICS AND GYNECOLOGY | Facility: CLINIC | Age: 38
End: 2023-02-07
Payer: MEDICAID

## 2023-02-07 VITALS — DIASTOLIC BLOOD PRESSURE: 62 MMHG | SYSTOLIC BLOOD PRESSURE: 118 MMHG | WEIGHT: 215.63 LBS

## 2023-02-07 DIAGNOSIS — O09.93 PREGNANCY, SUPERVISION, HIGH-RISK, THIRD TRIMESTER: Primary | ICD-10-CM

## 2023-02-07 DIAGNOSIS — O09.93 PREGNANCY, SUPERVISION, HIGH-RISK, THIRD TRIMESTER: ICD-10-CM

## 2023-02-07 LAB
BASOPHILS # BLD AUTO: 0.03 K/UL (ref 0–0.2)
BASOPHILS NFR BLD: 0.4 % (ref 0–1.9)
DIFFERENTIAL METHOD: ABNORMAL
EOSINOPHIL # BLD AUTO: 0.2 K/UL (ref 0–0.5)
EOSINOPHIL NFR BLD: 2.1 % (ref 0–8)
ERYTHROCYTE [DISTWIDTH] IN BLOOD BY AUTOMATED COUNT: 13.4 % (ref 11.5–14.5)
HCT VFR BLD AUTO: 35.9 % (ref 37–48.5)
HGB BLD-MCNC: 11.8 G/DL (ref 12–16)
HIV 1+2 AB+HIV1 P24 AG SERPL QL IA: NORMAL
IMM GRANULOCYTES # BLD AUTO: 0.03 K/UL (ref 0–0.04)
IMM GRANULOCYTES NFR BLD AUTO: 0.4 % (ref 0–0.5)
LYMPHOCYTES # BLD AUTO: 1.8 K/UL (ref 1–4.8)
LYMPHOCYTES NFR BLD: 20.9 % (ref 18–48)
MCH RBC QN AUTO: 32.8 PG (ref 27–31)
MCHC RBC AUTO-ENTMCNC: 32.9 G/DL (ref 32–36)
MCV RBC AUTO: 100 FL (ref 82–98)
MONOCYTES # BLD AUTO: 0.4 K/UL (ref 0.3–1)
MONOCYTES NFR BLD: 4.8 % (ref 4–15)
NEUTROPHILS # BLD AUTO: 6.1 K/UL (ref 1.8–7.7)
NEUTROPHILS NFR BLD: 71.4 % (ref 38–73)
NRBC BLD-RTO: 0 /100 WBC
PLATELET # BLD AUTO: 126 K/UL (ref 150–450)
PMV BLD AUTO: 12.5 FL (ref 9.2–12.9)
RBC # BLD AUTO: 3.6 M/UL (ref 4–5.4)
RPR SER QL: NORMAL
WBC # BLD AUTO: 8.5 K/UL (ref 3.9–12.7)

## 2023-02-07 PROCEDURE — 99999 PR PBB SHADOW E&M-EST. PATIENT-LVL II: CPT | Mod: PBBFAC,,, | Performed by: OBSTETRICS & GYNECOLOGY

## 2023-02-07 PROCEDURE — 87389 HIV-1 AG W/HIV-1&-2 AB AG IA: CPT

## 2023-02-07 PROCEDURE — 99999 PR PBB SHADOW E&M-EST. PATIENT-LVL II: ICD-10-PCS | Mod: PBBFAC,,, | Performed by: OBSTETRICS & GYNECOLOGY

## 2023-02-07 PROCEDURE — 99214 OFFICE O/P EST MOD 30 MIN: CPT | Mod: TH,S$PBB,, | Performed by: OBSTETRICS & GYNECOLOGY

## 2023-02-07 PROCEDURE — 99214 PR OFFICE/OUTPT VISIT, EST, LEVL IV, 30-39 MIN: ICD-10-PCS | Mod: TH,S$PBB,, | Performed by: OBSTETRICS & GYNECOLOGY

## 2023-02-07 PROCEDURE — 99212 OFFICE O/P EST SF 10 MIN: CPT | Mod: PBBFAC,TH,PN | Performed by: OBSTETRICS & GYNECOLOGY

## 2023-02-07 PROCEDURE — 85025 COMPLETE CBC W/AUTO DIFF WBC: CPT

## 2023-02-07 PROCEDURE — 87081 CULTURE SCREEN ONLY: CPT

## 2023-02-07 PROCEDURE — 86592 SYPHILIS TEST NON-TREP QUAL: CPT

## 2023-02-07 NOTE — H&P
HISTORY AND PHYSICAL                                                OBSTETRICS          Subjective:       Gifty Hernandez is a 37 y.o.  female with IUP at 39w0d weeks gestation who presents for IOL.    Patient denies contractions, denies vaginal bleeding, denies LOF.   Fetal Movement: normal.    This IUP is complicated by GDMA1, AMA.    Review of Systems   Constitutional: Negative.  Negative for chills, diaphoresis, fatigue and fever.   HENT:  Negative for nasal congestion.    Respiratory: Negative.  Negative for shortness of breath.    Cardiovascular: Negative.  Negative for leg swelling.   Gastrointestinal: Negative.  Negative for abdominal pain, bloating, constipation, nausea and vomiting.   Genitourinary: Negative.  Negative for dysmenorrhea, dyspareunia, dysuria, menorrhagia, menstrual problem, pelvic pain, vaginal bleeding, vaginal discharge, urinary incontinence, vaginal dryness and vaginal odor.   Musculoskeletal: Negative.  Negative for arthralgias and leg pain.   Integumentary:  Negative.   Neurological:  Negative for seizures and headaches.   All other systems reviewed and are negative.    PMHx:   Past Medical History:   Diagnosis Date    Diabetes mellitus        PSHx: No past surgical history on file.    All:   Review of patient's allergies indicates:   Allergen Reactions    Penicillins Other (See Comments)       Meds: (Not in a hospital admission)      SH:   Social History     Socioeconomic History    Marital status: Single   Tobacco Use    Smoking status: Never    Smokeless tobacco: Never   Substance and Sexual Activity    Alcohol use: Not Currently    Drug use: Never    Sexual activity: Yes     Partners: Male     Birth control/protection: None       FH:   Family History   Problem Relation Age of Onset    Ovarian cancer Paternal Aunt     Breast cancer Neg Hx     Colon cancer Neg Hx        OBHx:   OB History    Para Term  AB Living   1 0 0 0 0 0   SAB IAB Ectopic Multiple Live Births    0 0 0 0 0      # Outcome Date GA Lbr Ricardo/2nd Weight Sex Delivery Anes PTL Lv   1 Current                Objective:       /62   Wt 97.8 kg (215 lb 9.8 oz)     Vitals:    02/07/23 1035   BP: 118/62   Weight: 97.8 kg (215 lb 9.8 oz)       General:   alert, appears stated age and cooperative, no apparent distress   HENT:  normocephalic, atraumatic   Eyes:  extraocular movements and conjunctivae normal   Neck:  supple, range of motion normal, no thyromegaly   Lungs:   no respiratory distress   Heart:   regular rate   Abdomen:  soft, non-tender, non-distended but gravid, no rebound or guarding   Extremities negative edema, negative erythema   FHT: To be performed upon admission                 TOCO: To be performed upon admission   Presentations: To be performed upon admission   Cervix: To be performed upon admission   Sterile Speculum Exam: n/a    EFW by Leopold's: 7#    Recent Growth Scan:   1/11/23 @  32w0d: EFW at the 34% and the AC at the 79%. The EFW is 1974 g    Lab Review  Blood Type O POS  GBBS: pending  Rubella: Immune  RPR: Non reactive  HIV: negative  HepB: negative       Assessment:       39w0d weeks gestation for IOL    Plan:   IOL   Risks, benefits, alternatives and possible complications have been discussed in detail with the patient.   - Consents signed and to chart  - Admit to Labor and Delivery unit  - Epidural per Anesthesia  - Draw CBC, T&S  - IOL plan per L&D team  - EFW 7#  - Maternal pelvis never proven    2. GDMA1  -BG well controlled with diet in antepartum period  -Most recent growth 1/11 WNL, additional growth 2/9 pending at time of H&P  -q4 hours BG check in latent labor, q2 hours in active  -Will need 2-6wk PP GTT    Post-Partum Hemorrhage risk - low     Bee Ayers MD  OBGYN PGY-2    Seen and examined.  Agree with note.  All questions answered  ITA Echevarria MD

## 2023-02-07 NOTE — PROGRESS NOTES
Pregnancy dating, labs, ultrasound reports, prenatal testing, and problem list; prior records and results; and available outside records were reviewed and updated in EMR.  Pertinent findings were noted below.    Reason for Visit   Routine Prenatal Visit    HPI   37 y.o., at 35w6d by Estimated Date of Delivery: 3/8/23    Denies complaints  BG log:  Fasting  (315 elevated)  pB  (313 elevated)  pL  (2/12 elevated)  pD 101-130 (3/12 elevated)    Contractions: No   Bleeding: No   Loss of fluid: No   Fetal movement: Yes   Nausea: No   Vomiting: No   Headache: No     Exam   /62   Wt 97.8 kg (215 lb 9.8 oz)     TWlbs  GENERAL: No acute distress  ABD: Gravid    Assessment and Plan   Pregnancy, supervision, high-risk, third trimester  -     Strep B Screen, Vaginal / Rectal  -     RPR; Future; Expected date: 2023  -     CBC Auto Differential; Future; Expected date: 2023  -     HIV 1/2 Ag/Ab (4th Gen); Future; Expected date: 2023         Consents (delivery, blood, circumcision) signed   GBS collected, 3T labs collected   Vertex presentation   SVE closed/thick/high   BG log uploaded (<50% above desired value), continue diet control of gDM   Follow up ultrasound scheduled    Recommended IOL due to GDMA1, pt amenable, induction requested      labor precautions given  Follow-up: 2 weeks     Bee Ayers MD  OBGYN PGY-2

## 2023-02-08 ENCOUNTER — PATIENT MESSAGE (OUTPATIENT)
Dept: MATERNAL FETAL MEDICINE | Facility: CLINIC | Age: 38
End: 2023-02-08
Payer: MEDICAID

## 2023-02-09 ENCOUNTER — PROCEDURE VISIT (OUTPATIENT)
Dept: MATERNAL FETAL MEDICINE | Facility: CLINIC | Age: 38
End: 2023-02-09
Payer: MEDICAID

## 2023-02-09 DIAGNOSIS — Z36.89 ENCOUNTER FOR ULTRASOUND TO ASSESS FETAL GROWTH: ICD-10-CM

## 2023-02-09 DIAGNOSIS — O24.410 DIET CONTROLLED GESTATIONAL DIABETES MELLITUS (GDM), ANTEPARTUM: ICD-10-CM

## 2023-02-09 PROBLEM — D69.6 BENIGN GESTATIONAL THROMBOCYTOPENIA IN THIRD TRIMESTER: Status: ACTIVE | Noted: 2023-02-09

## 2023-02-09 PROBLEM — O99.113 BENIGN GESTATIONAL THROMBOCYTOPENIA IN THIRD TRIMESTER: Status: ACTIVE | Noted: 2023-02-09

## 2023-02-09 LAB — BACTERIA SPEC AEROBE CULT: NORMAL

## 2023-02-09 PROCEDURE — 76816 OB US FOLLOW-UP PER FETUS: CPT | Mod: PBBFAC | Performed by: OBSTETRICS & GYNECOLOGY

## 2023-02-09 PROCEDURE — 76816 US MFM PROCEDURE (VIEWPOINT): ICD-10-PCS | Mod: 26,S$PBB,, | Performed by: OBSTETRICS & GYNECOLOGY

## 2023-02-14 ENCOUNTER — ROUTINE PRENATAL (OUTPATIENT)
Dept: OBSTETRICS AND GYNECOLOGY | Facility: CLINIC | Age: 38
End: 2023-02-14
Payer: MEDICAID

## 2023-02-14 VITALS — DIASTOLIC BLOOD PRESSURE: 62 MMHG | SYSTOLIC BLOOD PRESSURE: 122 MMHG | WEIGHT: 217.81 LBS

## 2023-02-14 DIAGNOSIS — O24.410 DIET CONTROLLED GESTATIONAL DIABETES MELLITUS (GDM) IN THIRD TRIMESTER: ICD-10-CM

## 2023-02-14 DIAGNOSIS — D69.6 BENIGN GESTATIONAL THROMBOCYTOPENIA IN THIRD TRIMESTER: ICD-10-CM

## 2023-02-14 DIAGNOSIS — O99.113 BENIGN GESTATIONAL THROMBOCYTOPENIA IN THIRD TRIMESTER: ICD-10-CM

## 2023-02-14 DIAGNOSIS — O09.513 PRIMIGRAVIDA OF ADVANCED MATERNAL AGE IN THIRD TRIMESTER: Primary | ICD-10-CM

## 2023-02-14 PROBLEM — O99.119 BENIGN GESTATIONAL THROMBOCYTOPENIA, ANTEPARTUM: Status: ACTIVE | Noted: 2023-02-09

## 2023-02-14 PROCEDURE — 99214 OFFICE O/P EST MOD 30 MIN: CPT | Mod: TH,S$PBB,, | Performed by: OBSTETRICS & GYNECOLOGY

## 2023-02-14 PROCEDURE — 99214 PR OFFICE/OUTPT VISIT, EST, LEVL IV, 30-39 MIN: ICD-10-PCS | Mod: TH,S$PBB,, | Performed by: OBSTETRICS & GYNECOLOGY

## 2023-02-14 PROCEDURE — 99999 PR PBB SHADOW E&M-EST. PATIENT-LVL III: CPT | Mod: PBBFAC,,, | Performed by: OBSTETRICS & GYNECOLOGY

## 2023-02-14 PROCEDURE — 99213 OFFICE O/P EST LOW 20 MIN: CPT | Mod: PBBFAC,TH,PN | Performed by: OBSTETRICS & GYNECOLOGY

## 2023-02-14 PROCEDURE — 99999 PR PBB SHADOW E&M-EST. PATIENT-LVL III: ICD-10-PCS | Mod: PBBFAC,,, | Performed by: OBSTETRICS & GYNECOLOGY

## 2023-02-14 NOTE — PROGRESS NOTES
Pregnancy dating, labs, ultrasound reports, prenatal testing, and problem list; prior records and results; and available outside records were reviewed and updated in EMR.  Pertinent findings were noted below.    Reason for Visit: Routine Prenatal Visit (Doing well)    36w6d by Estimated Date of Delivery: 3/8/23    Blood pressure 122/62, weight 98.8 kg (217 lb 13 oz).    Primigravida of advanced maternal age in third trimester  -     IP OB Labor Induction; Future; Expected date: 03/02/2023    Diet controlled gestational diabetes mellitus (GDM) in third trimester  -     IP OB Labor Induction; Future; Expected date: 03/02/2023    Benign gestational thrombocytopenia in third trimester       Occasional cramps or contractions, not consistent.  No bleeding or loss of fluid.  +fetal movement.  Labs reviewed and up to date.  IOL requested for 3/2/2023 at 1700 when pt will be 39+1.  Pt counseled on induction procedure.   BS reviewed and normal.   Skkq=161.  Pt counseled on results.  Will need repeat CBC on admission to L&D.    Labor precautions given  Follow-up: 1 week

## 2023-02-16 NOTE — PROGRESS NOTES
Lab Documentation:    Order Type: Written Order placed in Psychiatric    Patient in for lab visit only per provider treatment plan.

## 2023-02-22 ENCOUNTER — ROUTINE PRENATAL (OUTPATIENT)
Dept: OBSTETRICS AND GYNECOLOGY | Facility: CLINIC | Age: 38
End: 2023-02-22
Attending: OBSTETRICS & GYNECOLOGY
Payer: MEDICAID

## 2023-02-22 VITALS — DIASTOLIC BLOOD PRESSURE: 68 MMHG | WEIGHT: 222.69 LBS | SYSTOLIC BLOOD PRESSURE: 125 MMHG

## 2023-02-22 DIAGNOSIS — Z34.83 ENCOUNTER FOR SUPERVISION OF OTHER NORMAL PREGNANCY IN THIRD TRIMESTER: Primary | ICD-10-CM

## 2023-02-22 PROCEDURE — 99213 OFFICE O/P EST LOW 20 MIN: CPT | Mod: PBBFAC,TH,PN | Performed by: ADVANCED PRACTICE MIDWIFE

## 2023-02-22 PROCEDURE — 99999 PR PBB SHADOW E&M-EST. PATIENT-LVL III: ICD-10-PCS | Mod: PBBFAC,,, | Performed by: ADVANCED PRACTICE MIDWIFE

## 2023-02-22 PROCEDURE — 99214 PR OFFICE/OUTPT VISIT, EST, LEVL IV, 30-39 MIN: ICD-10-PCS | Mod: TH,S$PBB,, | Performed by: ADVANCED PRACTICE MIDWIFE

## 2023-02-22 PROCEDURE — 99214 OFFICE O/P EST MOD 30 MIN: CPT | Mod: TH,S$PBB,, | Performed by: ADVANCED PRACTICE MIDWIFE

## 2023-02-22 PROCEDURE — 99999 PR PBB SHADOW E&M-EST. PATIENT-LVL III: CPT | Mod: PBBFAC,,, | Performed by: ADVANCED PRACTICE MIDWIFE

## 2023-02-22 NOTE — H&P (VIEW-ONLY)
HISTORY AND PHYSICAL                                                OBSTETRICS          Subjective:       Gifty Hernandez is a 37 y.o.  female with  MINOR 23    Patient  reports to routine office visit, pt denies contractions, denies vaginal bleeding, denies LOF.   Fetal Movement: normal.    This IUP is complicated by diet controlled GDM- glucose well controlled, GTP- platelets 126, obesity    Review of Systems   Constitutional: Negative.    HENT: Negative.     Respiratory: Negative.     Cardiovascular: Negative.    Gastrointestinal: Negative.    Genitourinary: Negative.    Musculoskeletal: Negative.    Integumentary:  Negative.   Neurological: Negative.    Psychiatric/Behavioral: Negative.       PMHx:   Past Medical History:   Diagnosis Date    Diabetes mellitus        PSHx: History reviewed. No pertinent surgical history.    All:   Review of patient's allergies indicates:   Allergen Reactions    Penicillins Other (See Comments)       Meds: (Not in a hospital admission)      SH:   Social History     Socioeconomic History    Marital status: Single   Tobacco Use    Smoking status: Never    Smokeless tobacco: Never   Substance and Sexual Activity    Alcohol use: Not Currently    Drug use: Never    Sexual activity: Yes     Partners: Male     Birth control/protection: None       FH:   Family History   Problem Relation Age of Onset    Ovarian cancer Paternal Aunt     Breast cancer Neg Hx     Colon cancer Neg Hx        OBHx:   OB History    Para Term  AB Living   1 0 0 0 0 0   SAB IAB Ectopic Multiple Live Births   0 0 0 0 0      # Outcome Date GA Lbr Ricardo/2nd Weight Sex Delivery Anes PTL Lv   1 Current                Objective:       /68   Wt 101 kg (222 lb 10.6 oz)     Vitals:    23 0958   BP: 125/68   Weight: 101 kg (222 lb 10.6 oz)       General:   alert, appears stated age and cooperative, no apparent distress   HENT:  normocephalic, atraumatic   Eyes:  extraocular movements and  conjunctivae normal   Neck:  supple, range of motion normal, no thyromegaly   Lungs:   no respiratory distress   Heart:   regular rate   Abdomen:  soft, non-tender, non-distended but gravid, no rebound or guarding   Extremities negative edema, negative erythema   FHT: To be performed upon admission                 TOCO: To be performed upon admission   Presentations: To be performed upon admission   Cervix: To be performed upon admission   Sterile Speculum Exam: PRN on admit    EFW by Leopold's: 8#    Recent Growth Scan: 36 wks- EFW- 56%/ AC 77%    Lab Review  Blood Type O POS  GBBS: negative  Rubella: Immune  RPR: non reactive  HIV: negative  HepB: negative       Assessment:       IUP with MINOR 03/08/23- scheduled IOL on 03/02/23    Plan:      Risks, benefits, alternatives and possible complications have been discussed in detail with the patient.   - Consents signed and to chart  - Admit to Labor and Delivery unit  - Epidural per Anesthesia  - Draw CBC, T&S  - IOL plan per L&D team  - EFW 8#  - Maternal pelvis- unproven      Post-Partum Hemorrhage risk - low

## 2023-02-22 NOTE — PROGRESS NOTES
Reason for visit: Routine Prenatal Visit      HPI:   37 y.o., at 38w0d by Estimated Date of Delivery: 3/8/23    In with no c/o    - Contractions: denies  - Bleeding: denies  - Loss of fluid: denies  - Fetal movement: reports good Fm, reinforced BID  - Nausea: no  - Vomiting: no  - Headache: no      Reviewed:    Past medical, surgical, social, family, and obstetric history: Reviewed and updated in EMR.  Medications: Reviewed and updated in EMR.  Allergies: Penicillins    Pregnancy dating, labs, ultrasound reports, prenatal testing, and problem list: Reviewed and updated in EMR.  Outside records: na  Independent interpretation of tests: na  Discussion with another healthcare professional: na      Vitals: /68   Wt 101 kg (222 lb 10.6 oz)     Physical exam:  GENERAL: No acute distress  ABD: Gravid      Assessment and Plan:    Encounter for supervision of other normal pregnancy in third trimester         Reviewed glucose record, 1 isolated elevated value   Reviewed IOL instructions   Reviewed s/s active labor, rom, bleeding and if occur report to L&D      Follow-up: 1 weeks      I spent a total of 20 minutes on the day of the visit. This includes face to face time and non-face to face time preparing to see the patient (eg, review of tests), Obtaining and/or reviewing separately obtained history, Documenting clinical information in the electronic or other health record, Independently interpreting results and communicating results to the patient/family/caregiver, or Care coordination.

## 2023-02-25 ENCOUNTER — ANESTHESIA (OUTPATIENT)
Dept: OBSTETRICS AND GYNECOLOGY | Facility: OTHER | Age: 38
End: 2023-02-25
Payer: MEDICAID

## 2023-02-25 ENCOUNTER — HOSPITAL ENCOUNTER (INPATIENT)
Facility: OTHER | Age: 38
LOS: 3 days | Discharge: HOME | End: 2023-02-28
Attending: OBSTETRICS & GYNECOLOGY | Admitting: OBSTETRICS & GYNECOLOGY
Payer: MEDICAID

## 2023-02-25 ENCOUNTER — ANESTHESIA EVENT (OUTPATIENT)
Dept: OBSTETRICS AND GYNECOLOGY | Facility: OTHER | Age: 38
End: 2023-02-25
Payer: MEDICAID

## 2023-02-25 DIAGNOSIS — O42.90 PROM (PREMATURE RUPTURE OF MEMBRANES): ICD-10-CM

## 2023-02-25 DIAGNOSIS — O42.90 PREMATURE RUPTURE OF MEMBRANES, UNSPECIFIED DURATION TO ONSET OF LABOR, UNSPECIFIED GESTATIONAL AGE: ICD-10-CM

## 2023-02-25 LAB
ABO + RH BLD: NORMAL
BASOPHILS # BLD AUTO: 0.02 K/UL (ref 0–0.2)
BASOPHILS NFR BLD: 0.2 % (ref 0–1.9)
BLD GP AB SCN CELLS X3 SERPL QL: NORMAL
DIFFERENTIAL METHOD: ABNORMAL
EOSINOPHIL # BLD AUTO: 0.1 K/UL (ref 0–0.5)
EOSINOPHIL NFR BLD: 0.8 % (ref 0–8)
ERYTHROCYTE [DISTWIDTH] IN BLOOD BY AUTOMATED COUNT: 13.4 % (ref 11.5–14.5)
HCT VFR BLD AUTO: 33.2 % (ref 37–48.5)
HGB BLD-MCNC: 11.2 G/DL (ref 12–16)
IMM GRANULOCYTES # BLD AUTO: 0.05 K/UL (ref 0–0.04)
IMM GRANULOCYTES NFR BLD AUTO: 0.5 % (ref 0–0.5)
LYMPHOCYTES # BLD AUTO: 1.8 K/UL (ref 1–4.8)
LYMPHOCYTES NFR BLD: 17.9 % (ref 18–48)
MCH RBC QN AUTO: 32.8 PG (ref 27–31)
MCHC RBC AUTO-ENTMCNC: 33.7 G/DL (ref 32–36)
MCV RBC AUTO: 97 FL (ref 82–98)
MONOCYTES # BLD AUTO: 0.5 K/UL (ref 0.3–1)
MONOCYTES NFR BLD: 5.1 % (ref 4–15)
NEUTROPHILS # BLD AUTO: 7.6 K/UL (ref 1.8–7.7)
NEUTROPHILS NFR BLD: 75.5 % (ref 38–73)
NRBC BLD-RTO: 0 /100 WBC
PLATELET # BLD AUTO: 123 K/UL (ref 150–450)
PMV BLD AUTO: 13 FL (ref 9.2–12.9)
POCT GLUCOSE: 89 MG/DL (ref 70–110)
POCT GLUCOSE: 90 MG/DL (ref 70–110)
RBC # BLD AUTO: 3.41 M/UL (ref 4–5.4)
WBC # BLD AUTO: 10.05 K/UL (ref 3.9–12.7)

## 2023-02-25 PROCEDURE — 27200710 HC EPIDURAL INFUSION PUMP SET: Performed by: STUDENT IN AN ORGANIZED HEALTH CARE EDUCATION/TRAINING PROGRAM

## 2023-02-25 PROCEDURE — 99283 EMERGENCY DEPT VISIT LOW MDM: CPT | Mod: 25,,, | Performed by: OBSTETRICS & GYNECOLOGY

## 2023-02-25 PROCEDURE — 62326 NJX INTERLAMINAR LMBR/SAC: CPT | Performed by: STUDENT IN AN ORGANIZED HEALTH CARE EDUCATION/TRAINING PROGRAM

## 2023-02-25 PROCEDURE — 59025 PR FETAL 2N-STRESS TEST: ICD-10-PCS | Mod: 26,,, | Performed by: OBSTETRICS & GYNECOLOGY

## 2023-02-25 PROCEDURE — 86900 BLOOD TYPING SEROLOGIC ABO: CPT

## 2023-02-25 PROCEDURE — 25000003 PHARM REV CODE 250

## 2023-02-25 PROCEDURE — 59025 FETAL NON-STRESS TEST: CPT | Mod: 26,,, | Performed by: OBSTETRICS & GYNECOLOGY

## 2023-02-25 PROCEDURE — 99222 PR INITIAL HOSPITAL CARE,LEVL II: ICD-10-PCS | Mod: ,,, | Performed by: OBSTETRICS & GYNECOLOGY

## 2023-02-25 PROCEDURE — 85025 COMPLETE CBC W/AUTO DIFF WBC: CPT

## 2023-02-25 PROCEDURE — 59410 OBSTETRICAL CARE: CPT | Mod: AA,,, | Performed by: ANESTHESIOLOGY

## 2023-02-25 PROCEDURE — C1751 CATH, INF, PER/CENT/MIDLINE: HCPCS | Performed by: STUDENT IN AN ORGANIZED HEALTH CARE EDUCATION/TRAINING PROGRAM

## 2023-02-25 PROCEDURE — 63600175 PHARM REV CODE 636 W HCPCS

## 2023-02-25 PROCEDURE — 99285 EMERGENCY DEPT VISIT HI MDM: CPT | Mod: 25

## 2023-02-25 PROCEDURE — 82962 GLUCOSE BLOOD TEST: CPT

## 2023-02-25 PROCEDURE — 11000001 HC ACUTE MED/SURG PRIVATE ROOM

## 2023-02-25 PROCEDURE — 59410 PRA OBSTE CARE,VAG DELIV+POSTPARTUM: ICD-10-PCS | Mod: AA,,, | Performed by: ANESTHESIOLOGY

## 2023-02-25 PROCEDURE — 63600175 PHARM REV CODE 636 W HCPCS: Performed by: STUDENT IN AN ORGANIZED HEALTH CARE EDUCATION/TRAINING PROGRAM

## 2023-02-25 PROCEDURE — 25000003 PHARM REV CODE 250: Performed by: STUDENT IN AN ORGANIZED HEALTH CARE EDUCATION/TRAINING PROGRAM

## 2023-02-25 PROCEDURE — 36415 COLL VENOUS BLD VENIPUNCTURE: CPT | Performed by: OBSTETRICS & GYNECOLOGY

## 2023-02-25 PROCEDURE — 99283 PR EMERGENCY DEPT VISIT,LEVEL III: ICD-10-PCS | Mod: 25,,, | Performed by: OBSTETRICS & GYNECOLOGY

## 2023-02-25 PROCEDURE — 59025 FETAL NON-STRESS TEST: CPT

## 2023-02-25 PROCEDURE — 99222 1ST HOSP IP/OBS MODERATE 55: CPT | Mod: ,,, | Performed by: OBSTETRICS & GYNECOLOGY

## 2023-02-25 RX ORDER — OXYTOCIN 10 [USP'U]/ML
10 INJECTION, SOLUTION INTRAMUSCULAR; INTRAVENOUS ONCE AS NEEDED
Status: DISCONTINUED | OUTPATIENT
Start: 2023-02-25 | End: 2023-02-26

## 2023-02-25 RX ORDER — TRANEXAMIC ACID 10 MG/ML
1000 INJECTION, SOLUTION INTRAVENOUS ONCE AS NEEDED
Status: DISCONTINUED | OUTPATIENT
Start: 2023-02-25 | End: 2023-02-26

## 2023-02-25 RX ORDER — LIDOCAINE HYDROCHLORIDE 10 MG/ML
10 INJECTION INFILTRATION; PERINEURAL ONCE AS NEEDED
Status: DISCONTINUED | OUTPATIENT
Start: 2023-02-25 | End: 2023-02-26

## 2023-02-25 RX ORDER — OXYTOCIN/RINGER'S LACTATE 30/500 ML
95 PLASTIC BAG, INJECTION (ML) INTRAVENOUS ONCE AS NEEDED
Status: COMPLETED | OUTPATIENT
Start: 2023-02-25 | End: 2023-02-26

## 2023-02-25 RX ORDER — MISOPROSTOL 200 UG/1
800 TABLET ORAL ONCE AS NEEDED
Status: DISCONTINUED | OUTPATIENT
Start: 2023-02-25 | End: 2023-02-26

## 2023-02-25 RX ORDER — CARBOPROST TROMETHAMINE 250 UG/ML
250 INJECTION, SOLUTION INTRAMUSCULAR
Status: CANCELLED | OUTPATIENT
Start: 2023-02-25

## 2023-02-25 RX ORDER — ONDANSETRON 8 MG/1
8 TABLET, ORALLY DISINTEGRATING ORAL EVERY 8 HOURS PRN
Status: DISCONTINUED | OUTPATIENT
Start: 2023-02-25 | End: 2023-02-26

## 2023-02-25 RX ORDER — METHYLERGONOVINE MALEATE 0.2 MG/ML
200 INJECTION INTRAVENOUS
Status: DISCONTINUED | OUTPATIENT
Start: 2023-02-25 | End: 2023-02-26

## 2023-02-25 RX ORDER — SIMETHICONE 80 MG
1 TABLET,CHEWABLE ORAL 4 TIMES DAILY PRN
Status: DISCONTINUED | OUTPATIENT
Start: 2023-02-25 | End: 2023-02-26

## 2023-02-25 RX ORDER — CALCIUM CARBONATE 200(500)MG
500 TABLET,CHEWABLE ORAL 3 TIMES DAILY PRN
Status: DISCONTINUED | OUTPATIENT
Start: 2023-02-25 | End: 2023-02-26

## 2023-02-25 RX ORDER — LIDOCAINE HYDROCHLORIDE AND EPINEPHRINE 15; 5 MG/ML; UG/ML
INJECTION, SOLUTION EPIDURAL
Status: DISCONTINUED | OUTPATIENT
Start: 2023-02-25 | End: 2023-02-26

## 2023-02-25 RX ORDER — SODIUM CHLORIDE, SODIUM LACTATE, POTASSIUM CHLORIDE, CALCIUM CHLORIDE 600; 310; 30; 20 MG/100ML; MG/100ML; MG/100ML; MG/100ML
INJECTION, SOLUTION INTRAVENOUS CONTINUOUS
Status: DISCONTINUED | OUTPATIENT
Start: 2023-02-25 | End: 2023-02-26

## 2023-02-25 RX ORDER — FENTANYL/BUPIVACAINE/NS/PF 2MCG/ML-.1
PLASTIC BAG, INJECTION (ML) INJECTION CONTINUOUS
Status: CANCELLED | OUTPATIENT
Start: 2023-02-25

## 2023-02-25 RX ORDER — OXYTOCIN/RINGER'S LACTATE 30/500 ML
334 PLASTIC BAG, INJECTION (ML) INTRAVENOUS ONCE
Status: DISCONTINUED | OUTPATIENT
Start: 2023-02-25 | End: 2023-02-26

## 2023-02-25 RX ORDER — OXYTOCIN/RINGER'S LACTATE 30/500 ML
334 PLASTIC BAG, INJECTION (ML) INTRAVENOUS ONCE AS NEEDED
Status: COMPLETED | OUTPATIENT
Start: 2023-02-25 | End: 2023-02-26

## 2023-02-25 RX ORDER — OXYTOCIN/RINGER'S LACTATE 30/500 ML
95 PLASTIC BAG, INJECTION (ML) INTRAVENOUS ONCE
Status: DISCONTINUED | OUTPATIENT
Start: 2023-02-25 | End: 2023-02-26

## 2023-02-25 RX ORDER — DIPHENOXYLATE HYDROCHLORIDE AND ATROPINE SULFATE 2.5; .025 MG/1; MG/1
1 TABLET ORAL 4 TIMES DAILY PRN
Status: CANCELLED | OUTPATIENT
Start: 2023-02-25

## 2023-02-25 RX ORDER — FAMOTIDINE 20 MG/1
20 TABLET, FILM COATED ORAL 2 TIMES DAILY
Status: DISCONTINUED | OUTPATIENT
Start: 2023-02-25 | End: 2023-02-26

## 2023-02-25 RX ORDER — FENTANYL/BUPIVACAINE/NS/PF 2MCG/ML-.1
PLASTIC BAG, INJECTION (ML) INJECTION
Status: DISCONTINUED | OUTPATIENT
Start: 2023-02-25 | End: 2023-02-26

## 2023-02-25 RX ORDER — SODIUM CITRATE AND CITRIC ACID MONOHYDRATE 334; 500 MG/5ML; MG/5ML
30 SOLUTION ORAL ONCE
Status: CANCELLED | OUTPATIENT
Start: 2023-02-26 | End: 2023-02-26

## 2023-02-25 RX ORDER — PROCHLORPERAZINE EDISYLATE 5 MG/ML
5 INJECTION INTRAMUSCULAR; INTRAVENOUS EVERY 6 HOURS PRN
Status: DISCONTINUED | OUTPATIENT
Start: 2023-02-25 | End: 2023-02-26

## 2023-02-25 RX ORDER — FENTANYL/BUPIVACAINE/NS/PF 2MCG/ML-.1
PLASTIC BAG, INJECTION (ML) INJECTION
Status: COMPLETED
Start: 2023-02-25 | End: 2023-02-25

## 2023-02-25 RX ORDER — CARBOPROST TROMETHAMINE 250 UG/ML
250 INJECTION, SOLUTION INTRAMUSCULAR
Status: DISCONTINUED | OUTPATIENT
Start: 2023-02-25 | End: 2023-02-26

## 2023-02-25 RX ORDER — MISOPROSTOL 200 UG/1
800 TABLET ORAL
Status: CANCELLED | OUTPATIENT
Start: 2023-02-25

## 2023-02-25 RX ORDER — METHYLERGONOVINE MALEATE 0.2 MG/ML
200 INJECTION INTRAVENOUS
Status: CANCELLED | OUTPATIENT
Start: 2023-02-25

## 2023-02-25 RX ORDER — SODIUM CHLORIDE 9 MG/ML
INJECTION, SOLUTION INTRAVENOUS
Status: DISCONTINUED | OUTPATIENT
Start: 2023-02-25 | End: 2023-02-26

## 2023-02-25 RX ORDER — OXYTOCIN/RINGER'S LACTATE 30/500 ML
0-30 PLASTIC BAG, INJECTION (ML) INTRAVENOUS CONTINUOUS
Status: DISCONTINUED | OUTPATIENT
Start: 2023-02-25 | End: 2023-02-26

## 2023-02-25 RX ADMIN — Medication 10.6 ML/HR: at 11:02

## 2023-02-25 RX ADMIN — Medication 4 MILLI-UNITS/MIN: at 09:02

## 2023-02-25 RX ADMIN — SODIUM CHLORIDE, POTASSIUM CHLORIDE, SODIUM LACTATE AND CALCIUM CHLORIDE: 600; 310; 30; 20 INJECTION, SOLUTION INTRAVENOUS at 09:02

## 2023-02-25 RX ADMIN — ONDANSETRON 8 MG: 8 TABLET, ORALLY DISINTEGRATING ORAL at 10:02

## 2023-02-25 RX ADMIN — LIDOCAINE HYDROCHLORIDE,EPINEPHRINE BITARTRATE 3 ML: 15; .005 INJECTION, SOLUTION EPIDURAL; INFILTRATION; INTRACAUDAL; PERINEURAL at 11:02

## 2023-02-25 RX ADMIN — SODIUM CHLORIDE, POTASSIUM CHLORIDE, SODIUM LACTATE AND CALCIUM CHLORIDE: 600; 310; 30; 20 INJECTION, SOLUTION INTRAVENOUS at 04:02

## 2023-02-25 RX ADMIN — Medication 5 ML: at 11:02

## 2023-02-25 RX ADMIN — MISOPROSTOL 50 MCG: 100 TABLET ORAL at 04:02

## 2023-02-25 NOTE — ED PROVIDER NOTES
Encounter Date: 2023       History     Chief Complaint   Patient presents with    Rupture of Membranes     HPI  Gifty Hernandez is a 37 y.o. F at 38w3d presents complaining of leakage of clear fluids.     This IUP is complicated by A1GDM, GTP, RNI, obesity, and AMA .  Patient denies contractions, denies vaginal bleeding, reports LOF.   Fetal Movement: normal.    Review of patient's allergies indicates:   Allergen Reactions    Penicillins Other (See Comments)     Past Medical History:   Diagnosis Date    Diabetes mellitus      No past surgical history on file.  Family History   Problem Relation Age of Onset    Ovarian cancer Paternal Aunt     Breast cancer Neg Hx     Colon cancer Neg Hx      Social History     Tobacco Use    Smoking status: Never    Smokeless tobacco: Never   Substance Use Topics    Alcohol use: Not Currently    Drug use: Never     Review of Systems   Constitutional:  Negative for chills and fever.   Eyes:  Negative for photophobia and visual disturbance.   Respiratory:  Negative for cough and shortness of breath.    Cardiovascular:  Negative for chest pain, palpitations and leg swelling.   Gastrointestinal:  Negative for abdominal pain, nausea and vomiting.   Genitourinary:  Positive for vaginal discharge. Negative for difficulty urinating, dysuria, flank pain, frequency, hematuria, pelvic pain, urgency, vaginal bleeding and vaginal pain.   Neurological:  Negative for headaches.     Physical Exam     Initial Vitals   BP Pulse Resp Temp SpO2   23 1405 23 1402 23 2000 23 1405 23 1402   (!) 109/55 78 16 97.3 °F (36.3 °C) 97 %      MAP       --                Physical Exam    Vitals reviewed.  Constitutional: She appears well-developed and well-nourished.   HENT:   Head: Normocephalic.   Eyes: Conjunctivae are normal. Pupils are equal, round, and reactive to light.   Neck:   Normal range of motion.  Cardiovascular:  Normal rate.           Pulmonary/Chest: Effort  normal. No respiratory distress.   Abdominal: Abdomen is soft. There is no abdominal tenderness.   No right CVA tenderness.  No left CVA tenderness. There is no rebound and no guarding.   Musculoskeletal:         General: Normal range of motion.      Cervical back: Normal range of motion.     Neurological: She is alert and oriented to person, place, and time.   Skin: Skin is warm and dry. Capillary refill takes less than 2 seconds.   Psychiatric: She has a normal mood and affect. Her behavior is normal. Judgment and thought content normal.     OB LABOR EXAM:   Pre-Term Labor: No.   Membranes ruptured: Yes.   Method: Sterile vaginal exam per MD and Sterile speculum exam per MD.   Vaginal Bleeding: none present.   Engagement: engaged.   Dilatation: 1.   Station: -3.   Effacement: 50%.   Amniotic Fluid Color: normal.   Amniotic Fluid Amount: copious.   Comments: + nitrizine, + posterior pooling, + ferning      ED Course   Obtain Fetal nonstress test (NST)    Date/Time: 2/25/2023 8:12 AM  Performed by: Rema Bella MD  Authorized by: Rema Bella MD     Nonstress Test:     Variability:  6-25 BPM    Decelerations:  None    Accelerations:  15 bpm    Baseline:  145    Uterine Irritability: No      Contractions:  Irregular  Biophysical Profile:     Nonstress Test Interpretation: reactive      Overall Impression:  Reassuring  Labs Reviewed   POCT GLUCOSE   POCT GLUCOSE MONITORING CONTINUOUS   POCT GLUCOSE MONITORING CONTINUOUS   POCT GLUCOSE MONITORING CONTINUOUS   POCT GLUCOSE MONITORING CONTINUOUS          Imaging Results    None          Medications   famotidine tablet 20 mg (0 mg Oral Hold 2/25/23 2100)   lactated ringers bolus 1,000 mL (has no administration in time range)   lactated ringers bolus 500 mL (has no administration in time range)   lactated ringers infusion ( Intravenous Verify Only 2/26/23 0691)   0.9%  NaCl infusion (has no administration in time range)   oxytocin 30 units in 500 mL lactated ringers  infusion (non-titrating) (0 cathryn-units/min Intravenous Hold 2/25/23 1445)   oxytocin 30 units in 500 mL lactated ringers infusion (non-titrating) (0 cathryn-units/min Intravenous Hold 2/25/23 1515)   tranexamic acid in NaCl,iso-os IVPB 1,000 mg (has no administration in time range)   ondansetron disintegrating tablet 8 mg (8 mg Oral Given 2/25/23 2224)   prochlorperazine injection Soln 5 mg (has no administration in time range)   calcium carbonate 200 mg calcium (500 mg) chewable tablet 500 mg (has no administration in time range)   simethicone chewable tablet 80 mg (has no administration in time range)   LIDOcaine HCL 10 mg/ml (1%) injection 10 mL (has no administration in time range)   oxytocin 30 units in 500 mL lactated ringers infusion (non-titrating) (0 cathryn-units/min Intravenous Hold 2/25/23 2128)   oxytocin 30 units in 500 mL lactated ringers infusion (non-titrating) (has no administration in time range)   oxytocin injection 10 Units (has no administration in time range)   miSOPROStoL tablet 800 mcg (has no administration in time range)   miSOPROStoL tablet 800 mcg (has no administration in time range)   methylergonovine injection 200 mcg (has no administration in time range)   carboprost injection 250 mcg (has no administration in time range)   tranexamic acid in NaCl,iso-os IVPB 1,000 mg (has no administration in time range)   oxytocin 30 units in 500 mL lactated ringers infusion (titrating) (4 cathryn-units/min Intravenous Verify Only 2/26/23 0648)   misoprostol split tablet 50 mcg (50 mcg Oral Given 2/25/23 1630)   fentanyl 2mcg/mL with BUPivacaine 0.1% in sdoium chloride 0.9% Epidural 2 mcg/mL- 0.1 % Soln (  Override pull for Anesthesia 2/25/23 2343)     Medical Decision Making:   ED Management:  1. PROM:   - VSS   - NST RR; TOCO irregular contractions   - SVE: 1/50/-3   - SSE: + nitrizine, posterior pooling, ferning   - BSUS: vertex   - Consents signed at bedside.   - Discussed with OB ED staff, patient  stable for transfer to L&D.   Other:   I discussed test(s) with the performing physician.          Attending Attestation:   Physician Attestation Statement for Resident:  As the supervising MD   Physician Attestation Statement: I have personally seen and examined this patient.   I agree with the above history.  -:   As the supervising MD I agree with the above PE.     As the supervising MD I agree with the above treatment, course, plan, and disposition.   I was personally present during the critical portions of the procedure(s) performed by the resident and was immediately available in the ED to provide services and assistance as needed during the entire procedure.                             Clinical Impression:   Final diagnoses:  [O42.90] PROM (premature rupture of membranes)        ED Disposition Condition    Send to L&D                 Rema Bella MD  Resident  02/26/23 0337       Kayli Chen MD  02/27/23 7227

## 2023-02-25 NOTE — INTERVAL H&P NOTE
Gifty Hernandez is 37 y.o.  at 38w3d wga presenting for PROM.     FHT: 145 bpm, moderate variability, +accels, -decels; Cat 1 (reassuring)  [place]: difficult discern TOCO   Presentation: cephalic by ultrasound    SVE: /-3    1) PROM  - Plan for cytotec     2) A1GDM:   - BG on admit 89   - BG q 4 hrs in latent, q 2 hrs in active   - Growth U/S: 3078q 56th%, AC 77th% 36w0d   - Fasting glucose on postpartum day #1     3) Obesity:   - Less than 40     4) GTP:   - Plts 126   - Will notify anesthesia     5) RNI:   - MMR postpartum     6) AMA:   - Mat21: negative        Active Hospital Problems    Diagnosis  POA    *PROM (premature rupture of membranes) [O42.90]  Unknown      Resolved Hospital Problems   No resolved problems to display.

## 2023-02-25 NOTE — ANESTHESIA PREPROCEDURE EVALUATION
[facility]  Anesthesia Pre-Operative Evaluation         Patient Name: Gifty Hernandez  YOB: 1985  MRN: [id number]    2023      Gifty Hernandez is a 37 y.o. female  @ 38w3d who presents with ROM. IUP complicated by AMA, BMI 38, gDM (diet controlled), asthma (related to COVID), and gestational thrombocytopenia (plt 123 on admission). Patient denies cardiopulmonary disease, bleeding disorders, or spine pathology.     OB History    Para Term  AB Living   1             SAB IAB Ectopic Multiple Live Births                  # Outcome Date GA Lbr Ricardo/2nd Weight Sex Delivery Anes PTL Lv   1 Current                Review of patient's allergies indicates:   Allergen Reactions    Penicillins Other (See Comments)       Wt Readings from Last 1 Encounters:   23 1500 100.7 kg (222 lb)       BP Readings from Last 3 Encounters:   23 (!) 109/55   23 125/68   23 122/62       Patient Active Problem List   Diagnosis    Advanced maternal age, primigravida, antepartum    Diet controlled gestational diabetes mellitus (GDM), antepartum    Rubella non-immune status, antepartum    Asthma    Benign gestational thrombocytopenia, antepartum    PROM (premature rupture of membranes)       No past surgical history on file.    Social History     Socioeconomic History    Marital status: Single   Tobacco Use    Smoking status: Never    Smokeless tobacco: Never   Substance and Sexual Activity    Alcohol use: Not Currently    Drug use: Never    Sexual activity: Yes     Partners: Male     Birth control/protection: None         Chemistry    No results found for: NA, K, CL, CO2, BUN, CREATININE, GLU No results found for: CALCIUM, ALKPHOS, AST, ALT, BILITOT, ESTGFRAFRICA, EGFRNONAA         Lab Results   Component Value Date    WBC 10.05 2023    HGB 11.2 (L) 2023    HCT 33.2 (L) 2023    MCV 97 2023     (L) 2023       No results for  input(s): PT, INR, PROTIME, APTT in the last 72 hours.            Pre-op Assessment    I have reviewed the Patient Summary Reports.     I have reviewed the Nursing Notes. I have reviewed the NPO Status.   I have reviewed the Medications.     Review of Systems  Anesthesia Hx:  No problems with previous Anesthesia  Denies Family Hx of Anesthesia complications.   Denies Personal Hx of Anesthesia complications.   Hematology/Oncology:  Hematology Normal   Oncology Normal     EENT/Dental:EENT/Dental Normal   Cardiovascular:  Cardiovascular Normal     Pulmonary:   Asthma    Renal/:  Renal/ Normal     Hepatic/GI:  Hepatic/GI Normal    Musculoskeletal:  Musculoskeletal Normal    Neurological:  Neurology Normal    Endocrine:   Diabetes    Dermatological:  Skin Normal    Psych:  Psychiatric Normal           Physical Exam  General: Well nourished    Airway:  Mallampati: II   Mouth Opening: Normal  TM Distance: Normal  Tongue: Normal  Neck ROM: Normal ROM    Dental:  Intact    Chest/Lungs:  Clear to auscultation, Normal Respiratory Rate    Heart:  Rate: Normal  Rhythm: Regular Rhythm  Sounds: Normal    Abdomen:  Normal, Nontender        Anesthesia Plan  Type of Anesthesia, risks & benefits discussed:    Anesthesia Type: Epidural, Spinal, CSE, Gen ETT  Intra-op Monitoring Plan: Standard ASA Monitors  Post Op Pain Control Plan: multimodal analgesia and IV/PO Opioids PRN  Airway Plan: Video  Informed Consent: Informed consent signed with the Patient and all parties understand the risks and agree with anesthesia plan.  All questions answered.   ASA Score: 3  Day of Surgery Review of History & Physical: I have interviewed and examined the patient. I have reviewed the patient's H&P dated: There are no significant changes.     Ready For Surgery From Anesthesia Perspective.     .

## 2023-02-26 ENCOUNTER — PATIENT MESSAGE (OUTPATIENT)
Dept: LACTATION | Facility: CLINIC | Age: 38
End: 2023-02-26
Payer: MEDICAID

## 2023-02-26 LAB
POCT GLUCOSE: 59 MG/DL (ref 70–110)
POCT GLUCOSE: 64 MG/DL (ref 70–110)
POCT GLUCOSE: 70 MG/DL (ref 70–110)
POCT GLUCOSE: 71 MG/DL (ref 70–110)

## 2023-02-26 PROCEDURE — 25000003 PHARM REV CODE 250

## 2023-02-26 PROCEDURE — 63600175 PHARM REV CODE 636 W HCPCS

## 2023-02-26 PROCEDURE — 51701 INSERT BLADDER CATHETER: CPT

## 2023-02-26 PROCEDURE — 72100003 HC LABOR CARE, EA. ADDL. 8 HRS

## 2023-02-26 PROCEDURE — 59409 PR OBSTETRICAL CARE,VAG DELIV ONLY: ICD-10-PCS | Mod: 22,AT,, | Performed by: OBSTETRICS & GYNECOLOGY

## 2023-02-26 PROCEDURE — 72200005 HC VAGINAL DELIVERY LEVEL II

## 2023-02-26 PROCEDURE — 11000001 HC ACUTE MED/SURG PRIVATE ROOM

## 2023-02-26 PROCEDURE — 25000003 PHARM REV CODE 250: Performed by: STUDENT IN AN ORGANIZED HEALTH CARE EDUCATION/TRAINING PROGRAM

## 2023-02-26 PROCEDURE — 59409 OBSTETRICAL CARE: CPT | Mod: 22,AT,, | Performed by: OBSTETRICS & GYNECOLOGY

## 2023-02-26 PROCEDURE — 51702 INSERT TEMP BLADDER CATH: CPT

## 2023-02-26 PROCEDURE — 72100002 HC LABOR CARE, 1ST 8 HOURS

## 2023-02-26 PROCEDURE — 63600175 PHARM REV CODE 636 W HCPCS: Performed by: STUDENT IN AN ORGANIZED HEALTH CARE EDUCATION/TRAINING PROGRAM

## 2023-02-26 RX ORDER — METHYLERGONOVINE MALEATE 0.2 MG/ML
200 INJECTION INTRAVENOUS
Status: DISCONTINUED | OUTPATIENT
Start: 2023-02-26 | End: 2023-02-28 | Stop reason: HOSPADM

## 2023-02-26 RX ORDER — MISOPROSTOL 200 UG/1
800 TABLET ORAL ONCE AS NEEDED
Status: DISCONTINUED | OUTPATIENT
Start: 2023-02-26 | End: 2023-02-28 | Stop reason: HOSPADM

## 2023-02-26 RX ORDER — OXYCODONE HYDROCHLORIDE 5 MG/1
5 TABLET ORAL EVERY 6 HOURS PRN
Status: DISCONTINUED | OUTPATIENT
Start: 2023-02-26 | End: 2023-02-28 | Stop reason: HOSPADM

## 2023-02-26 RX ORDER — OXYCODONE HYDROCHLORIDE 5 MG/1
10 TABLET ORAL EVERY 6 HOURS PRN
Status: DISCONTINUED | OUTPATIENT
Start: 2023-02-26 | End: 2023-02-28 | Stop reason: HOSPADM

## 2023-02-26 RX ORDER — DIPHENHYDRAMINE HYDROCHLORIDE 50 MG/ML
25 INJECTION INTRAMUSCULAR; INTRAVENOUS EVERY 4 HOURS PRN
Status: DISCONTINUED | OUTPATIENT
Start: 2023-02-26 | End: 2023-02-28 | Stop reason: HOSPADM

## 2023-02-26 RX ORDER — SIMETHICONE 80 MG
1 TABLET,CHEWABLE ORAL EVERY 6 HOURS PRN
Status: DISCONTINUED | OUTPATIENT
Start: 2023-02-26 | End: 2023-02-28 | Stop reason: HOSPADM

## 2023-02-26 RX ORDER — BUPIVACAINE HYDROCHLORIDE 2.5 MG/ML
INJECTION, SOLUTION INFILTRATION; PERINEURAL
Status: DISCONTINUED | OUTPATIENT
Start: 2023-02-26 | End: 2023-02-26

## 2023-02-26 RX ORDER — OXYTOCIN 10 [USP'U]/ML
10 INJECTION, SOLUTION INTRAMUSCULAR; INTRAVENOUS ONCE AS NEEDED
Status: DISCONTINUED | OUTPATIENT
Start: 2023-02-26 | End: 2023-02-28 | Stop reason: HOSPADM

## 2023-02-26 RX ORDER — ACETAMINOPHEN 325 MG/1
650 TABLET ORAL EVERY 6 HOURS
Status: DISCONTINUED | OUTPATIENT
Start: 2023-02-26 | End: 2023-02-28 | Stop reason: HOSPADM

## 2023-02-26 RX ORDER — POLYETHYLENE GLYCOL 3350 17 G/17G
17 POWDER, FOR SOLUTION ORAL DAILY
Status: DISCONTINUED | OUTPATIENT
Start: 2023-02-26 | End: 2023-02-28 | Stop reason: HOSPADM

## 2023-02-26 RX ORDER — SODIUM CHLORIDE 0.9 % (FLUSH) 0.9 %
10 SYRINGE (ML) INJECTION
Status: DISCONTINUED | OUTPATIENT
Start: 2023-02-26 | End: 2023-02-28 | Stop reason: HOSPADM

## 2023-02-26 RX ORDER — CLINDAMYCIN PHOSPHATE 900 MG/50ML
900 INJECTION, SOLUTION INTRAVENOUS
Status: DISCONTINUED | OUTPATIENT
Start: 2023-02-26 | End: 2023-02-26

## 2023-02-26 RX ORDER — OXYTOCIN/RINGER'S LACTATE 30/500 ML
334 PLASTIC BAG, INJECTION (ML) INTRAVENOUS ONCE AS NEEDED
Status: DISCONTINUED | OUTPATIENT
Start: 2023-02-26 | End: 2023-02-28 | Stop reason: HOSPADM

## 2023-02-26 RX ORDER — ACETAMINOPHEN 325 MG/1
650 TABLET ORAL EVERY 6 HOURS PRN
Status: DISCONTINUED | OUTPATIENT
Start: 2023-02-26 | End: 2023-02-28 | Stop reason: HOSPADM

## 2023-02-26 RX ORDER — DIPHENHYDRAMINE HCL 25 MG
25 CAPSULE ORAL EVERY 4 HOURS PRN
Status: DISCONTINUED | OUTPATIENT
Start: 2023-02-26 | End: 2023-02-28 | Stop reason: HOSPADM

## 2023-02-26 RX ORDER — DOCUSATE SODIUM 100 MG/1
100 CAPSULE, LIQUID FILLED ORAL 2 TIMES DAILY
Status: DISCONTINUED | OUTPATIENT
Start: 2023-02-26 | End: 2023-02-28

## 2023-02-26 RX ORDER — ACETAMINOPHEN 500 MG
1000 TABLET ORAL ONCE
Status: COMPLETED | OUTPATIENT
Start: 2023-02-26 | End: 2023-02-26

## 2023-02-26 RX ORDER — OXYTOCIN/RINGER'S LACTATE 30/500 ML
95 PLASTIC BAG, INJECTION (ML) INTRAVENOUS ONCE AS NEEDED
Status: DISCONTINUED | OUTPATIENT
Start: 2023-02-26 | End: 2023-02-28 | Stop reason: HOSPADM

## 2023-02-26 RX ORDER — FENTANYL CITRATE 50 UG/ML
INJECTION, SOLUTION INTRAMUSCULAR; INTRAVENOUS
Status: DISCONTINUED | OUTPATIENT
Start: 2023-02-26 | End: 2023-02-26

## 2023-02-26 RX ORDER — ONDANSETRON 8 MG/1
8 TABLET, ORALLY DISINTEGRATING ORAL EVERY 8 HOURS PRN
Status: DISCONTINUED | OUTPATIENT
Start: 2023-02-26 | End: 2023-02-28 | Stop reason: HOSPADM

## 2023-02-26 RX ORDER — DOCUSATE SODIUM 100 MG/1
200 CAPSULE, LIQUID FILLED ORAL 2 TIMES DAILY PRN
Status: DISCONTINUED | OUTPATIENT
Start: 2023-02-26 | End: 2023-02-28 | Stop reason: HOSPADM

## 2023-02-26 RX ORDER — HYDROCORTISONE 25 MG/G
CREAM TOPICAL 3 TIMES DAILY PRN
Status: DISCONTINUED | OUTPATIENT
Start: 2023-02-26 | End: 2023-02-28 | Stop reason: HOSPADM

## 2023-02-26 RX ORDER — IBUPROFEN 600 MG/1
600 TABLET ORAL EVERY 6 HOURS
Status: DISCONTINUED | OUTPATIENT
Start: 2023-02-26 | End: 2023-02-28

## 2023-02-26 RX ORDER — PRENATAL WITH FERROUS FUM AND FOLIC ACID 3080; 920; 120; 400; 22; 1.84; 3; 20; 10; 1; 12; 200; 27; 25; 2 [IU]/1; [IU]/1; MG/1; [IU]/1; MG/1; MG/1; MG/1; MG/1; MG/1; MG/1; UG/1; MG/1; MG/1; MG/1; MG/1
1 TABLET ORAL DAILY
Status: DISCONTINUED | OUTPATIENT
Start: 2023-02-26 | End: 2023-02-28 | Stop reason: HOSPADM

## 2023-02-26 RX ORDER — OXYTOCIN/RINGER'S LACTATE 30/500 ML
95 PLASTIC BAG, INJECTION (ML) INTRAVENOUS ONCE
Status: DISCONTINUED | OUTPATIENT
Start: 2023-02-26 | End: 2023-02-28 | Stop reason: HOSPADM

## 2023-02-26 RX ORDER — TRANEXAMIC ACID 10 MG/ML
1000 INJECTION, SOLUTION INTRAVENOUS ONCE AS NEEDED
Status: DISCONTINUED | OUTPATIENT
Start: 2023-02-26 | End: 2023-02-28 | Stop reason: HOSPADM

## 2023-02-26 RX ORDER — CARBOPROST TROMETHAMINE 250 UG/ML
250 INJECTION, SOLUTION INTRAMUSCULAR
Status: DISCONTINUED | OUTPATIENT
Start: 2023-02-26 | End: 2023-02-28 | Stop reason: HOSPADM

## 2023-02-26 RX ORDER — PROCHLORPERAZINE EDISYLATE 5 MG/ML
5 INJECTION INTRAMUSCULAR; INTRAVENOUS EVERY 6 HOURS PRN
Status: DISCONTINUED | OUTPATIENT
Start: 2023-02-26 | End: 2023-02-28 | Stop reason: HOSPADM

## 2023-02-26 RX ADMIN — BUPIVACAINE HYDROCHLORIDE 6 ML: 2.5 INJECTION, SOLUTION INFILTRATION; PERINEURAL at 09:02

## 2023-02-26 RX ADMIN — ACETAMINOPHEN 650 MG: 325 TABLET, FILM COATED ORAL at 06:02

## 2023-02-26 RX ADMIN — CLINDAMYCIN PHOSPHATE 900 MG: 900 INJECTION, SOLUTION INTRAVENOUS at 11:02

## 2023-02-26 RX ADMIN — Medication 95 MILLI-UNITS/MIN: at 12:02

## 2023-02-26 RX ADMIN — ONDANSETRON 8 MG: 8 TABLET, ORALLY DISINTEGRATING ORAL at 07:02

## 2023-02-26 RX ADMIN — IBUPROFEN 600 MG: 600 TABLET, FILM COATED ORAL at 06:02

## 2023-02-26 RX ADMIN — FENTANYL CITRATE 100 MCG: 0.05 INJECTION, SOLUTION INTRAMUSCULAR; INTRAVENOUS at 09:02

## 2023-02-26 RX ADMIN — GENTAMICIN SULFATE 146.4 MG: 40 INJECTION, SOLUTION INTRAMUSCULAR; INTRAVENOUS at 01:02

## 2023-02-26 RX ADMIN — DOCUSATE SODIUM 100 MG: 100 CAPSULE, LIQUID FILLED ORAL at 08:02

## 2023-02-26 RX ADMIN — Medication 334 MILLI-UNITS/MIN: at 11:02

## 2023-02-26 RX ADMIN — SODIUM CHLORIDE, POTASSIUM CHLORIDE, SODIUM LACTATE AND CALCIUM CHLORIDE: 600; 310; 30; 20 INJECTION, SOLUTION INTRAVENOUS at 11:02

## 2023-02-26 RX ADMIN — ACETAMINOPHEN 1000 MG: 500 TABLET, FILM COATED ORAL at 11:02

## 2023-02-26 NOTE — ANESTHESIA PROCEDURE NOTES
Epidural    Patient location during procedure: OB   Reason for block: primary anesthetic   Reason for block: labor analgesia requested by patient and obstetrician  Diagnosis: Intrauterine pregnancy   Start time: 2/25/2023 11:11 PM  Timeout: 2/25/2023 11:07 PM  End time: 2/25/2023 11:18 PM  Surgery related to: Vaginal Delivery    Staffing  Performing Provider: Bakari Gee MD  Authorizing Provider: Thomas Loza MD        Preanesthetic Checklist  Completed: patient identified, IV checked, site marked, risks and benefits discussed, surgical consent, monitors and equipment checked, pre-op evaluation, timeout performed, anesthesia consent given, hand hygiene performed and patient being monitored  Preparation  Patient position: sitting  Prep: ChloraPrep  Patient monitoring: Pulse Ox and Blood Pressure  Reason for block: primary anesthetic   Epidural  Skin Anesthetic: lidocaine 1%  Skin Wheal: 3 mL  Administration type: continuous  Approach: midline  Interspace: L4-5    Injection technique: OLIVA air  Needle and Epidural Catheter  Needle type: Tuohy   Needle gauge: 17  Needle length: 3.5 inches  Needle insertion depth: 7.5 cm  Catheter type: [employer]  Catheter size: 19 G  Catheter at skin depth: 12.5 cm  Insertion Attempts: 1  Test dose: 3 mL of lidocaine 1.5% with Epi 1-to-200,000  Additional Documentation: incremental injection, negative aspiration for heme and CSF, no paresthesia on injection, no signs/symptoms of IV or SA injection, no significant pain on injection and no significant complaints from patient  Needle localization: anatomical landmarks  Medications:  Volume per aspiration: 5 mL  Time between aspirations: 5 minutes   Assessment  Ease of block: easy  Patient's tolerance of the procedure: comfortable throughout block and no complaints No inadvertent dural puncture with Tuohy.  Dural puncture performed with spinal needle.

## 2023-02-26 NOTE — PROGRESS NOTES
"LABOR NOTE    S:  Complaints: No.  Epidural working:  not applicable      MD to bedside for cervical check     O: /64   Pulse 61   Temp 97.6 °F (36.4 °C) (Oral)   Resp 16   Ht 5' 4" (1.626 m)   Wt 100.7 kg (222 lb)   SpO2 95%   Breastfeeding No   BMI 38.11 kg/m²     FHT: 130 Cat 1 +accels, -decels, moderate variability (reassuring)  CTX: q 2-4 minutes  SVE: 1.5/70/-3    Timeline:   1400: 1/50/-3   1630: cytotec given   2130: 1.5/70/-3, start pitocin     PLAN:    Continue Close Maternal/Fetal Monitoring  Pitocin Augmentation per protocol  Recheck 4 hours or PRN      Selina Oquendo MD   PGY-3, OB-GYN        "

## 2023-02-26 NOTE — PROGRESS NOTES
"LABOR NOTE    S:  Complaints: No.  Epidural working:  yes      MD to bedside for cervical check     O: BP (!) 97/52   Pulse (!) 49   Temp 97.6 °F (36.4 °C) (Oral)   Resp 16   Ht 5' 4" (1.626 m)   Wt 100.7 kg (222 lb)   SpO2 97%   Breastfeeding No   BMI 38.11 kg/m²     FHT: 130 Cat 2 +accels, occasional variable/late decels, moderate variability (overall reassuring)  CTX: q 2-4 minutes  SVE: 3/80/-2    Timeline:   1400: 50/-3   1630: cytotec given   2130: 1.5/70/-3, start pitocin   0100: 3/80/-2, pit @ 8    PLAN:    Continue Close Maternal/Fetal Monitoring  Pitocin Augmentation per protocol  Recheck 4 hours or PRN      GDM:   - B-90      Selina Oquendo MD   PGY-3, OB-GYN        "

## 2023-02-26 NOTE — PROGRESS NOTES
"LABOR NOTE    S:  Complaints: No.  Epidural working:  no - anesthesia at bedside to evaluate    MD to bedside for cervical check     O: /60   Pulse (!) 55   Temp 100.1 °F (37.8 °C) (Axillary)   Resp 16   Ht 5' 4" (1.626 m)   Wt 100.7 kg (222 lb)   SpO2 97%   Breastfeeding No   BMI 38.11 kg/m²     FHT: 160 Cat 1 +accels, - decels, moderate variability (overall reassuring)  CTX: q 2-4 minutes  SVE: 9/90/+1, pit @ 8    Timeline:   1400: 1/50/-3   1630: cytotec given   2130: 1.5/70/-3, start pitocin   0100: 3/80/-2, pit @ 8  0430: 5/90/-1  0915: 9/90/+1, pit @ 8    PLAN:  Continue Close Maternal/Fetal Monitoring  Pitocin Augmentation per protocol  Recheck 4 hours or PRN    Veronika Broderick MD  OB/GYN PGY-1   "

## 2023-02-26 NOTE — NURSING
Latest Reference Range & Units 02/26/23 03:56   POCT Glucose 70 - 110 mg/dL 59 (L)   (L): Data is abnormally low    MD notified of low blood glucose. MD ordered pt to drink apple juice and recheck BG in 2 hrs. Apple juice given.

## 2023-02-26 NOTE — PROGRESS NOTES
Pt. temp 97.8 upon admit to [facility]. Discontinue Antibiotics per Dr. Boecking. Will continue to monitor.

## 2023-02-26 NOTE — L&D DELIVERY NOTE
[facility] - Labor & Delivery  Vaginal Delivery   Operative Note    SUMMARY     Normal spontaneous vaginal delivery of live infant, was placed on mothers abdomen for skin to skin and bulb suctioning performed.  Infant delivered position OA over intact perineum.  Nuchal cord: No.    Spontaneous delivery of placenta and IV pitocin given noting good uterine tone.  3rd degree laceration noted and repaired in end to end fashion. 2nd degree repair was then repaired in normal fashion using 3.0 vicryl and 2.0 vicryl. Both repairs performed by Dr. Katherine Boecking.     Patient tolerated delivery well. Sponge needle and lap counted correctly x2.    Indications:  (spontaneous vaginal delivery)  Pregnancy complicated by:   Patient Active Problem List   Diagnosis    Advanced maternal age, primigravida, antepartum    Diet controlled gestational diabetes mellitus (GDM), antepartum    Rubella non-immune status, antepartum    Asthma    Benign gestational thrombocytopenia, antepartum    PROM (premature rupture of membranes)     (spontaneous vaginal delivery)     Admitting GA: 38w4d    Delivery Information for Kevin Hernandez    Birth information:  YOB: 2023   Time of birth: 11:46 AM   Sex: male   Head Delivery Date/Time: 2023 11:46 AM   Delivery type: Vaginal, Spontaneous   Gestational Age: 38w4d  Unknown    Delivery Providers    Delivering clinician: Mindi Hoskins MD   Provider Role    Maribell Posadas MD Resident    Katherine C Boecking, MD Hannah Peavy, RN Registered Nurse    Jeanette Rogers RN Registered Nurse    Morelia JEAN BAPTISTE [facility] Surgical Tech    Bhumi Sanchez RN Registered Nurse    Sarah Parks RN Registered Nurse              Measurements    Weight: 3830 g  Weight (lbs): 8 lb 7.1 oz  Length:          Apgars    Living status: Living  Apgars:  1 min.:  5 min.:  10 min.:  15 min.:  20 min.:    Skin color:  0  1       Heart rate:  2  2       Reflex irritability:  2  2       Muscle tone:  2   2       Respiratory effort:  2  2       Total:  8  9       Apgars assigned by: NARDA TOELNTINO RN         Operative Delivery    Forceps attempted?: No  Vacuum extractor attempted?: No         Shoulder Dystocia    Shoulder dystocia present?: No           Presentation    Presentation: Vertex  Position: Left Occiput Anterior           Interventions/Resuscitation    Method: Bulb Suctioning, Tactile Stimulation, Deep Suctioning       Cord    Vessels: 3 vessels  Complications: None  Delayed Cord Clamping?: Yes  Cord Clamped Date/Time: 2023 11:47 AM  Cord Blood Disposition: Sent with Baby  Gases Sent?: No  Stem Cell Collection (by MD): No       Placenta    Placenta delivery date/time: 2023 1152  Placenta removal: Spontaneous  Placenta appearance: Intact  Placenta disposition: Discarded           Labor Events:       labor: No     Labor Onset Date/Time:         Dilation Complete Date/Time:         Start Pushing Date/Time:         Start Pushing Date/Time:       Rupture Date/Time: 23  1250         Rupture type: SRM (Spontaneous Rupture)           Fluid Amount:         Fluid Color: Clear                 steroids: None     Antibiotics given for GBS: No     Induction: misoprostol     Indications for induction:  Premature ROM     Augmentation: oxytocin     Indications for augmentation: Ineffective Contraction Pattern     Labor complications: Chorioamnionitis     Additional complications:          Cervical ripening:                     Delivery:      Episiotomy: None     Indication for Episiotomy:       Perineal Lacerations: 3rd Repaired:  Yes   Periurethral Laceration:   Repaired:     Labial Laceration:   Repaired:     Sulcus Laceration:   Repaired:     Vaginal Laceration:   Repaired:     Cervical Laceration:   Repaired:     Repair suture: None     Repair # of packets: 4     Last Value - EBL - Nursing (mL):       Sum - EBL - Nursing (mL): 0     Last Value - EBL - Anesthesia (mL):        Calculated QBL (mL):  286        Vaginal Sweep Performed: Yes     Surgicount Correct: Yes     Vaginal Packing: No Quantity:       Other providers:       Anesthesia    Method: Epidural          Details (if applicable):  Trial of Labor      Categorization:      Priority:     Indications for :     Incision Type:       Additional  information:  Forceps:    Vacuum:    Breech:    Observed anomalies    Other (Comments):

## 2023-02-26 NOTE — PROGRESS NOTES
"LABOR NOTE    S:  Complaints: No.  Epidural working:  yes      MD to bedside for cervical check     O: BP (!) 102/54   Pulse (!) 53   Temp 97.9 °F (36.6 °C)   Resp 16   Ht 5' 4" (1.626 m)   Wt 100.7 kg (222 lb)   SpO2 96%   Breastfeeding No   BMI 38.11 kg/m²     FHT: 130 Cat 1 +accels, - decels, moderate variability (overall reassuring)  CTX: q 2-4 minutes  SVE: /-    Timeline:   1400: 50/-3   1630: cytotec given   2130: 1.5/-3, start pitocin   0100: 3/80/-2, pit @ 8  0430: 1    PLAN:    Continue Close Maternal/Fetal Monitoring  Pitocin Augmentation per protocol  Recheck 4 hours or PRN      GDM:   - B-90      Neli Merritt MD PGY-1  Obstetrics and Gynecology  [facility]         "

## 2023-02-27 LAB
BASOPHILS # BLD AUTO: 0.03 K/UL (ref 0–0.2)
BASOPHILS NFR BLD: 0.2 % (ref 0–1.9)
DIFFERENTIAL METHOD: ABNORMAL
EOSINOPHIL # BLD AUTO: 0.2 K/UL (ref 0–0.5)
EOSINOPHIL NFR BLD: 1.2 % (ref 0–8)
ERYTHROCYTE [DISTWIDTH] IN BLOOD BY AUTOMATED COUNT: 13.4 % (ref 11.5–14.5)
GLUCOSE SERPL-MCNC: 86 MG/DL (ref 70–110)
HCT VFR BLD AUTO: 31.5 % (ref 37–48.5)
HGB BLD-MCNC: 10.4 G/DL (ref 12–16)
IMM GRANULOCYTES # BLD AUTO: 0.07 K/UL (ref 0–0.04)
IMM GRANULOCYTES NFR BLD AUTO: 0.5 % (ref 0–0.5)
LYMPHOCYTES # BLD AUTO: 2 K/UL (ref 1–4.8)
LYMPHOCYTES NFR BLD: 14.3 % (ref 18–48)
MCH RBC QN AUTO: 32.9 PG (ref 27–31)
MCHC RBC AUTO-ENTMCNC: 33 G/DL (ref 32–36)
MCV RBC AUTO: 100 FL (ref 82–98)
MONOCYTES # BLD AUTO: 0.7 K/UL (ref 0.3–1)
MONOCYTES NFR BLD: 5 % (ref 4–15)
NEUTROPHILS # BLD AUTO: 10.9 K/UL (ref 1.8–7.7)
NEUTROPHILS NFR BLD: 78.8 % (ref 38–73)
NRBC BLD-RTO: 0 /100 WBC
PLATELET # BLD AUTO: 96 K/UL (ref 150–450)
PMV BLD AUTO: 12.8 FL (ref 9.2–12.9)
POCT GLUCOSE: 94 MG/DL (ref 70–110)
RBC # BLD AUTO: 3.16 M/UL (ref 4–5.4)
WBC # BLD AUTO: 13.86 K/UL (ref 3.9–12.7)

## 2023-02-27 PROCEDURE — 99232 PR SUBSEQUENT HOSPITAL CARE,LEVL II: ICD-10-PCS | Mod: ,,, | Performed by: OBSTETRICS & GYNECOLOGY

## 2023-02-27 PROCEDURE — 99232 SBSQ HOSP IP/OBS MODERATE 35: CPT | Mod: ,,, | Performed by: OBSTETRICS & GYNECOLOGY

## 2023-02-27 PROCEDURE — 85025 COMPLETE CBC W/AUTO DIFF WBC: CPT | Performed by: OBSTETRICS & GYNECOLOGY

## 2023-02-27 PROCEDURE — 11000001 HC ACUTE MED/SURG PRIVATE ROOM

## 2023-02-27 PROCEDURE — 36415 COLL VENOUS BLD VENIPUNCTURE: CPT | Performed by: STUDENT IN AN ORGANIZED HEALTH CARE EDUCATION/TRAINING PROGRAM

## 2023-02-27 PROCEDURE — 82947 ASSAY GLUCOSE BLOOD QUANT: CPT | Performed by: STUDENT IN AN ORGANIZED HEALTH CARE EDUCATION/TRAINING PROGRAM

## 2023-02-27 PROCEDURE — 25000003 PHARM REV CODE 250: Performed by: STUDENT IN AN ORGANIZED HEALTH CARE EDUCATION/TRAINING PROGRAM

## 2023-02-27 RX ADMIN — DOCUSATE SODIUM 200 MG: 100 CAPSULE, LIQUID FILLED ORAL at 09:02

## 2023-02-27 RX ADMIN — IBUPROFEN 600 MG: 600 TABLET, FILM COATED ORAL at 12:02

## 2023-02-27 RX ADMIN — PRENATAL VIT W/ FE FUMARATE-FA TAB 27-0.8 MG 1 TABLET: 27-0.8 TAB at 09:02

## 2023-02-27 RX ADMIN — ACETAMINOPHEN 650 MG: 325 TABLET, FILM COATED ORAL at 11:02

## 2023-02-27 RX ADMIN — ACETAMINOPHEN 650 MG: 325 TABLET, FILM COATED ORAL at 05:02

## 2023-02-27 RX ADMIN — ACETAMINOPHEN 650 MG: 325 TABLET, FILM COATED ORAL at 12:02

## 2023-02-27 RX ADMIN — IBUPROFEN 600 MG: 600 TABLET, FILM COATED ORAL at 05:02

## 2023-02-27 RX ADMIN — ACETAMINOPHEN 650 MG: 325 TABLET, FILM COATED ORAL at 06:02

## 2023-02-27 RX ADMIN — IBUPROFEN 600 MG: 600 TABLET, FILM COATED ORAL at 06:02

## 2023-02-27 RX ADMIN — IBUPROFEN 600 MG: 600 TABLET, FILM COATED ORAL at 11:02

## 2023-02-27 RX ADMIN — POLYETHYLENE GLYCOL 3350 17 G: 17 POWDER, FOR SOLUTION ORAL at 09:02

## 2023-02-27 RX ADMIN — DOCUSATE SODIUM 200 MG: 100 CAPSULE, LIQUID FILLED ORAL at 08:02

## 2023-02-27 NOTE — PROGRESS NOTES
POSTPARTUM PROGRESS NOTE    Subjective:     PPD/POD#: 1   Procedure:    EGA: 38w4d   N/V: No   F/C: No   Abd Pain: Mild, well-controlled with oral pain medication   Lochia: Mild   Voiding: Yes   Ambulating: Yes   Bowel fnc: Yes   Breastfeeding: Yes   Contraception: Per primary OB   Circumcision: Consented.  Needs to be examined by OB attending.     Objective:      Temp:  [97.6 °F (36.4 °C)-100.7 °F (38.2 °C)] 97.6 °F (36.4 °C)  Pulse:  [44-68] 53  Resp:  [16-18] 18  SpO2:  [97 %-98 %] 97 %  BP: ()/(46-69) 111/56    Lung: Normal respiratory effort   Abdomen: Soft, appropriately tender   Uterus: Firm, no fundal tenderness   Incision: N/A   : Deferred   Extremities: Bilateral trace edema     Lab Review    No results for input(s): NA, K, CL, CO2, BUN, CREATININE, GLU, PROT, BILITOT, ALKPHOS, ALT, AST, MG, PHOS in the last 168 hours.    Recent Labs   Lab 23  1448 23  0512   WBC 10.05 13.86*   HGB 11.2* 10.4*   HCT 33.2* 31.5*   MCV 97 100*   * 96*         I/O    Intake/Output Summary (Last 24 hours) at 2023 0652  Last data filed at 2023 2220  Gross per 24 hour   Intake 1178.02 ml   Output 4436 ml   Net -3257.98 ml        Assessment and Plan:   Postpartum care:  - Patient doing well.  - Continue routine management and advance    Gestational Diabetes  - Fasting B  - Will need 2 hour gtt at postpartum visit    GTP  -Plts 96    Chorioamnionitis  - VS as above  - currently afebrile  - no fundal tenderness  - clindamycin/Gentamicin: status post    Rubella nonimmune  - Will need MMR postpartum    3c laceration  -repair intact this AM  -bowel regimen ordered    Maribell Posadas MD PGY-1  Obstetrics and Gynecology

## 2023-02-27 NOTE — CARE UPDATE
Patient afebrile on arrival to [facility].     Patient s/p gent/clinda on arrival to [facility]. Patient with severe PCN allergy (reports purpura). Decision made not to administer cefoxitin in the setting of 3C laceration as adequate coverage provided by gent/clinda.    Katherine Boecking MD   Ob/Gyn PGY-3

## 2023-02-27 NOTE — PLAN OF CARE
Pt ambulating and voiding without difficulty. Patient safety maintained x2, side rails up, bed low and locked position.  Pain well controlled with scheduled pain medication. Fundus shifted slightly to the right, firm, with light lochia. VSS. Significant other at bedside; parents responding to infant cues and bonding appropriately.

## 2023-02-27 NOTE — LACTATION NOTE
Situation: initiated lactation consult    Background: day 1 postpartum,  reports infant did not nurse in L+D, received glucose gel and formula d/t hypoglycemia     Assessment:   Max assist at breast, infant on/off, does not sustain sucking. After multiple attempts and position changes, no significant latch achieved. Best attempt in laid back. Hand expressing throughout attempts, infant licking breast. HE x 5 minutes, 2mL achieved and spoon fed.     D/t inconsistent latch, pt considering additional supplementation. Educated on available options including mom's own milk, donor breast milk and formula, including R/B/A. Pt considering donor milk, provided with written information and consent form.     Actions:   1.  Reviewed basics of breastfeeding and managing breastfeeding difficulties, taught hand expression and feeding colostrum when there's episode of inefficient latch.   2. Discussed indication for supplementation and supplementation options.   3. Educated on plan of care  - attempt at breast x 10 minutes  - HE x 5-10 minutes and spoon feed  - if infant still showing cues, offer supplementation      Results: Pt agrees to the plan of feeding. V/U and questions answered.       02/26/23 5421   Maternal Assessment   Breast Shape pendulous;round   Breast Density soft   Areola elastic   Nipples everted   Maternal Infant Feeding   Maternal Emotional State assist needed   Infant Positioning clutch/football;laid back (ventral)   Signs of Milk Transfer   (on/off)   Latch Assistance yes   Additional Documentation Breastfeeding Supplementation (Group)

## 2023-02-27 NOTE — ANESTHESIA POSTPROCEDURE EVALUATION
Anesthesia Post Evaluation    Patient: Gifty Hernandez    Procedure(s) Performed: * No procedures listed *    Final Anesthesia Type: epidural      Patient location during evaluation: floor  Patient participation: Yes- Able to Participate  Level of consciousness: awake and alert and oriented  Post-procedure vital signs: reviewed and stable  Pain management: adequate  Airway patency: patent  MJ mitigation strategies: Multimodal analgesia  PONV status at discharge: No PONV  Anesthetic complications: no      Cardiovascular status: hemodynamically stable and blood pressure returned to baseline  Respiratory status: unassisted, spontaneous ventilation and room air  Hydration status: euvolemic  Follow-up not needed.          Vitals Value Taken Time   /56 02/27/23 0449   Temp 36.4 °C (97.6 °F) 02/27/23 0449   Pulse 53 02/27/23 0449   Resp 18 02/27/23 0449   SpO2 97 % 02/27/23 0449         No case tracking events are documented in the log.      Pain/Ricco Score: Pain Rating Prior to Med Admin: 0 (2/27/2023  6:13 AM)

## 2023-02-28 ENCOUNTER — TELEPHONE (OUTPATIENT)
Dept: OBSTETRICS AND GYNECOLOGY | Facility: CLINIC | Age: 38
End: 2023-02-28
Payer: MEDICAID

## 2023-02-28 VITALS
RESPIRATION RATE: 19 BRPM | BODY MASS INDEX: 37.9 KG/M2 | HEIGHT: 64 IN | TEMPERATURE: 98 F | SYSTOLIC BLOOD PRESSURE: 122 MMHG | DIASTOLIC BLOOD PRESSURE: 59 MMHG | OXYGEN SATURATION: 98 % | HEART RATE: 55 BPM | WEIGHT: 222 LBS

## 2023-02-28 PROCEDURE — 99238 HOSP IP/OBS DSCHRG MGMT 30/<: CPT | Mod: ICN,,, | Performed by: OBSTETRICS & GYNECOLOGY

## 2023-02-28 PROCEDURE — 90710 MMRV VACCINE SC: CPT | Performed by: STUDENT IN AN ORGANIZED HEALTH CARE EDUCATION/TRAINING PROGRAM

## 2023-02-28 PROCEDURE — 63600175 PHARM REV CODE 636 W HCPCS: Performed by: STUDENT IN AN ORGANIZED HEALTH CARE EDUCATION/TRAINING PROGRAM

## 2023-02-28 PROCEDURE — 25000003 PHARM REV CODE 250: Performed by: STUDENT IN AN ORGANIZED HEALTH CARE EDUCATION/TRAINING PROGRAM

## 2023-02-28 PROCEDURE — 90471 IMMUNIZATION ADMIN: CPT | Performed by: STUDENT IN AN ORGANIZED HEALTH CARE EDUCATION/TRAINING PROGRAM

## 2023-02-28 PROCEDURE — 25000003 PHARM REV CODE 250

## 2023-02-28 PROCEDURE — 99238 PR HOSPITAL DISCHARGE DAY,<30 MIN: ICD-10-PCS | Mod: ICN,,, | Performed by: OBSTETRICS & GYNECOLOGY

## 2023-02-28 RX ORDER — IBUPROFEN 600 MG/1
600 TABLET ORAL EVERY 6 HOURS PRN
Qty: 30 TABLET | Refills: 0 | Status: SHIPPED | OUTPATIENT
Start: 2023-02-28 | End: 2023-04-05

## 2023-02-28 RX ORDER — POLYETHYLENE GLYCOL 3350 17 G/17G
17 POWDER, FOR SOLUTION ORAL DAILY
Qty: 510 G | Refills: 0 | Status: SHIPPED | OUTPATIENT
Start: 2023-02-28 | End: 2023-04-05

## 2023-02-28 RX ORDER — DEXTROMETHORPHAN HYDROBROMIDE, GUAIFENESIN 5; 100 MG/5ML; MG/5ML
650 LIQUID ORAL EVERY 8 HOURS
Qty: 30 TABLET | Refills: 0 | Status: SHIPPED | OUTPATIENT
Start: 2023-02-28 | End: 2023-03-07

## 2023-02-28 RX ORDER — IBUPROFEN 600 MG/1
600 TABLET ORAL
Status: DISCONTINUED | OUTPATIENT
Start: 2023-02-28 | End: 2023-02-28 | Stop reason: HOSPADM

## 2023-02-28 RX ORDER — AMOXICILLIN 250 MG
1 CAPSULE ORAL DAILY
Status: DISCONTINUED | OUTPATIENT
Start: 2023-02-28 | End: 2023-02-28 | Stop reason: HOSPADM

## 2023-02-28 RX ORDER — DOCUSATE SODIUM 100 MG/1
200 CAPSULE, LIQUID FILLED ORAL 2 TIMES DAILY PRN
Qty: 60 CAPSULE | Refills: 0 | Status: SHIPPED | OUTPATIENT
Start: 2023-02-28 | End: 2023-02-28 | Stop reason: HOSPADM

## 2023-02-28 RX ORDER — AMOXICILLIN 250 MG
1 CAPSULE ORAL DAILY
Qty: 30 TABLET | Refills: 1 | Status: SHIPPED | OUTPATIENT
Start: 2023-02-28 | End: 2023-04-05

## 2023-02-28 RX ADMIN — ACETAMINOPHEN 650 MG: 325 TABLET, FILM COATED ORAL at 02:02

## 2023-02-28 RX ADMIN — ACETAMINOPHEN 650 MG: 325 TABLET, FILM COATED ORAL at 05:02

## 2023-02-28 RX ADMIN — IBUPROFEN 600 MG: 600 TABLET, FILM COATED ORAL at 05:02

## 2023-02-28 RX ADMIN — IBUPROFEN 600 MG: 600 TABLET, FILM COATED ORAL at 12:02

## 2023-02-28 RX ADMIN — MEASLES, MUMPS, AND RUBELLA VIRUS VACCINE LIVE 0.5 ML: 1000; 12500; 1000 INJECTION, POWDER, LYOPHILIZED, FOR SUSPENSION SUBCUTANEOUS at 02:02

## 2023-02-28 RX ADMIN — SENNOSIDES AND DOCUSATE SODIUM 1 TABLET: 50; 8.6 TABLET ORAL at 09:02

## 2023-02-28 RX ADMIN — POLYETHYLENE GLYCOL 3350 17 G: 17 POWDER, FOR SOLUTION ORAL at 09:02

## 2023-02-28 RX ADMIN — PRENATAL VIT W/ FE FUMARATE-FA TAB 27-0.8 MG 1 TABLET: 27-0.8 TAB at 09:02

## 2023-02-28 NOTE — PLAN OF CARE
Mom VSS. Breastfeeding . Mom is walking and voiding . Pain control with PO pain medication and family is at the bedside .

## 2023-02-28 NOTE — DISCHARGE SUMMARY
Delivery Discharge Summary  Obstetrics      Primary OB Clinician: ALEKS Aguilar MD      Admission date: 2023  Discharge date: 2023    Disposition: To home, self care    Discharge Diagnosis List:      Patient Active Problem List   Diagnosis    Advanced maternal age, primigravida, antepartum    Diet controlled gestational diabetes mellitus (GDM), antepartum    Rubella non-immune status, antepartum    Asthma    Benign gestational thrombocytopenia, antepartum    PROM (premature rupture of membranes)     (spontaneous vaginal delivery)    3C obstetrical tear       Procedure:     Hospital Course:  Gifty Hernandez is a 37 y.o. now , PPD #2 who was admitted on 2023 at 38w4d for PROM . Patient was subsequently admitted to labor and delivery unit with signed consents.     Labor course was complicated by chorioamnionitis and was treated with antibiotics. Labor course resulted in  complicated by third degree laceration that was repaired appropriately. She was treated with antibiotics and started don a bowel regimen.    Please see delivery note for further details. Her postpartum course was uncomplicated. On discharge day, patient's pain is controlled with oral pain medications. Pt is tolerating ambulation without SOB or CP, and regular diet without N/V. Reports lochia is mild. Denies any HA, vision changes, F/C, LE swelling. Denies any breast pain/soreness.    Pt in stable condition and ready for discharge. She has been instructed to start and/or continue medications and follow up with her obstetrics provider as listed below. Patient will need MMR postpartum.    Pertinent studies:  CBC  Recent Labs   Lab 23  1448 23  0512   WBC 10.05 13.86*   HGB 11.2* 10.4*   HCT 33.2* 31.5*   MCV 97 100*   * 96*      Immunization History   Administered Date(s) Administered    COVID-19, MRNA, LN-S, PF (MODERNA FULL 0.5 ML DOSE) 2021, 2021, 2021    COVID-19, mRNA, LNP-S,  bivalent booster, PF (Moderna Omicron) 11/05/2022    Influenza - Quadrivalent 12/14/2021    Influenza - Quadrivalent - PF *Preferred* (6 months and older) 12/14/2021, 11/14/2022    Tdap 12/13/2022        Delivery:    Episiotomy: None   Lacerations: 3rd   Repair suture: None   Repair # of packets: 4   Blood loss (ml):       Birth information:  YOB: 2023   Time of birth: 11:46 AM   Sex: male   Delivery type: Vaginal, Spontaneous   Gestational Age: 38w4d    Delivery Clinician:      Other providers:       Additional  information:  Forceps:    Vacuum:    Breech:    Observed anomalies      Living?:           APGARS  One minute Five minutes Ten minutes   Skin color:         Heart rate:         Grimace:         Muscle tone:         Breathing:         Totals: 8  9        Placenta: Delivered:       appearance    Patient Instructions:   Current Discharge Medication List        START taking these medications    Details   acetaminophen (TYLENOL) 650 MG TbSR Take 1 tablet (650 mg total) by mouth every 8 (eight) hours.  Qty: 30 tablet, Refills: 0      benzocaine-lanolin (DERMOPLAST) 20-0.5 % Aero Apply topically continuous as needed for pain  Qty: 78 g, Refills: 0      ibuprofen (ADVIL,MOTRIN) 600 MG tablet Take 1 tablet (600 mg total) by mouth every 6 (six) hours as needed for Pain.  Qty: 30 tablet, Refills: 0      polyethylene glycol (GLYCOLAX) 17 gram/dose powder Take 17 g by mouth once daily.  Qty: 510 g, Refills: 0      senna-docusate 8.6-50 mg (PERICOLACE) 8.6-50 mg per tablet Take 1 tablet by mouth once daily.  Qty: 30 tablet, Refills: 1           CONTINUE these medications which have NOT CHANGED    Details   famotidine (PEPCID) 20 MG tablet Take 1 tablet (20 mg total) by mouth 2 (two) times daily.  Qty: 60 tablet, Refills: 1    Associated Diagnoses: Gastroesophageal reflux disease without esophagitis      loratadine (CLARITIN) 10 mg tablet Take 10 mg by mouth once daily.           STOP taking these  medications       BD ALCOHOL SWABS PadM Comments:   Reason for Stopping:         ondansetron (ZOFRAN-ODT) 8 MG TbDL Comments:   Reason for Stopping:         SUPER THIN LANCETS 28 gauge Misc Comments:   Reason for Stopping:         TRUE METRIX GLUCOSE METER Misc Comments:   Reason for Stopping:         TRUE METRIX GLUCOSE TEST STRIP Strp Comments:   Reason for Stopping:               Discharge Procedure Orders   Diet Adult Regular     Lifting restrictions   Order Comments: Lifting no more than infant's weight for 6 weeks.     No driving until:   Order Comments: No driving until discontinued narcotics and no pain with stepping on brakes.     Pelvic Rest   Order Comments: Nothing in the vagina including intercourse for 6 weeks.     Notify your health care provider if you experience any of the following:  temperature >100.4     Notify your health care provider if you experience any of the following:  persistent nausea and vomiting or diarrhea     Notify your health care provider if you experience any of the following:  severe uncontrolled pain     Notify your health care provider if you experience any of the following:  redness, tenderness, or signs of infection (pain, swelling, redness, odor or green/yellow discharge around incision site)     Notify your health care provider if you experience any of the following:  severe persistent headache     Notify your health care provider if you experience any of the following:  persistent dizziness, light-headedness, or visual disturbances     Notify your health care provider if you experience any of the following:  increased confusion or weakness     Notify your health care provider if you experience any of the following:   Order Comments: Heavy vaginal bleeding saturating more than 2 pads in 1 hour.     Activity as tolerated        Follow-up Information       J Ric Aguilar MD. Schedule an appointment as soon as possible for a visit in 6 week(s).    Specialties: Obstetrics  and Gynecology, Obstetrics, Gynecology  Why: For post partum visit  Contact information:  [address]  [place] [address]  [phone]               JOSE Aguilar MD. Schedule an appointment as soon as possible for a visit in 2 week(s).    Specialties: Obstetrics and Gynecology, Obstetrics, Gynecology  Why: for laceration check  Contact information:  [address]  [place] [address]  [phone]                            Veronika Broderick MD  OB/GYN PGY-1

## 2023-02-28 NOTE — PROGRESS NOTES
POSTPARTUM PROGRESS NOTE    Subjective:     PPD/POD#: 2   Procedure:    EGA: 38w4d   N/V: No   F/C: No   Abd Pain: Moderate; patient declining PO narcotics due to fear of constipation even with stool softeners   Lochia: Mild   Voiding: Yes   Ambulating: Yes   Bowel fnc: Yes   Breastfeeding: Yes   Contraception: Patient declines, partner vasectomy   Circumcision: Consented.  Needs to be examined by OB attending.     Objective:      Temp:  [97.9 °F (36.6 °C)-98.9 °F (37.2 °C)] 98 °F (36.7 °C)  Pulse:  [57-69] 60  Resp:  [17-18] 17  SpO2:  [96 %-99 %] 98 %  BP: (106-141)/(54-62) 141/62    Lung: Normal respiratory effort   Abdomen: Soft, appropriately tender   Uterus: Firm, no fundal tenderness   Incision: N/A   : Deferred   Extremities: Bilateral trace edema     Lab Review    No results for input(s): NA, K, CL, CO2, BUN, CREATININE, GLU, PROT, BILITOT, ALKPHOS, ALT, AST, MG, PHOS in the last 168 hours.    Recent Labs   Lab 23  1448 23  0512   WBC 10.05 13.86*   HGB 11.2* 10.4*   HCT 33.2* 31.5*   MCV 97 100*   * 96*         I/O  No intake or output data in the 24 hours ending 23 0641       Assessment and Plan:   Postpartum care:  - Patient doing well.  - Continue routine management and advance    Gestational Diabetes  - Fasting B  - Will need 2 hour gtt at postpartum visit    GTP  -Plts 96    Chorioamnionitis  - VS as above  - currently afebrile  - no fundal tenderness  - clindamycin/Gentamicin: status post    Rubella nonimmune  - Will need MMR postpartum    3c laceration  -repair intact   -bowel regimen ordered    Veronika Broderick MD  OB/GYN PGY-1

## 2023-02-28 NOTE — LACTATION NOTE
02/28/23 1130   Maternal Assessment   Breast Shape Bilateral:;pendulous   Breast Density Bilateral:;soft   Areola Bilateral:;elastic   Nipples Bilateral:;everted  (short)   Maternal Infant Feeding   Maternal Emotional State assist needed   Infant Positioning clutch/football  (nipple shield needed)   Signs of Milk Transfer infant jaw motion present   Latch Assistance yes   Breastfeeding Supplementation   Breastfeeding Supplementation Type donor breast milk;expressed breast milk   Infant Indication for Supplementation   (hx of low cbg)   Equipment Type   Breast Pump Type double electric, hospital grade   Breast Pump Flange Type hard   Breast Pumping   Breast Pumping Interventions post-feed pumping encouraged   Community Referrals   Community Referrals outpatient lactation program  (community resources)     Pt reports baby has not been latching to breast. Discuss supplementing baby with ebm and donor breastmilk. Assisted pt with position and latch. Baby rooting but unable to latch to breast. Oral assessment done with gloved finger. Baby  finger with tongue but finger is easily slipped out of mouth when pull. Baby with limited tongue mobility.  Nipple shield used to help facilitate latch to breast. Baby was able to latch after several attempts. Tugs and pulls observed. Stimulation needed to keep baby active at breast. After 30 minutes of working with breastfeeding, plan to now offer donor breastmilk.(Pt had pumped 1 hr earlier)  discharge education provided on feeding baby at home. Baby is to be feed at cue or at least every 3 hours. Recommended pt attempt breastfeeding for 20 minutes, supplement baby with ebm/formula and then pump for breastmilk production. Discharge education provided. Questions answered. Pt aware of community resources for addition lactation support. Pt has lactation contact number.

## 2023-02-28 NOTE — PLAN OF CARE
Pt ambulating and voiding without difficulty. Patient safety maintained, side rails up, bed low and locked position.  Pain well controlled with PRN pain medication. Fundus midline, firm, with moderate lochia. VSS. Significant other at bedside; parents responding to infant cues and bonding appropriately.

## 2023-02-28 NOTE — TELEPHONE ENCOUNTER
----- Message from Meghan Baker RN sent at 2/28/2023  9:29 AM CST -----  Hi,. Can you please call ms amanda to set up a 1-2 week incision check (3rd deg) thank you

## 2023-03-01 ENCOUNTER — PATIENT MESSAGE (OUTPATIENT)
Dept: OBSTETRICS AND GYNECOLOGY | Facility: OTHER | Age: 38
End: 2023-03-01
Payer: MEDICAID

## 2023-03-07 ENCOUNTER — POSTPARTUM VISIT (OUTPATIENT)
Dept: OBSTETRICS AND GYNECOLOGY | Facility: CLINIC | Age: 38
End: 2023-03-07
Payer: MEDICAID

## 2023-03-07 VITALS
BODY MASS INDEX: 33.69 KG/M2 | WEIGHT: 197.31 LBS | SYSTOLIC BLOOD PRESSURE: 112 MMHG | DIASTOLIC BLOOD PRESSURE: 70 MMHG | HEIGHT: 64 IN

## 2023-03-07 DIAGNOSIS — Z13.31 DEPRESSION SCREENING: ICD-10-CM

## 2023-03-07 PROCEDURE — 1111F DSCHRG MED/CURRENT MED MERGE: CPT | Mod: CPTII,,, | Performed by: OBSTETRICS & GYNECOLOGY

## 2023-03-07 PROCEDURE — 99213 OFFICE O/P EST LOW 20 MIN: CPT | Mod: PBBFAC,TH,PN | Performed by: OBSTETRICS & GYNECOLOGY

## 2023-03-07 PROCEDURE — 99999 PR PBB SHADOW E&M-EST. PATIENT-LVL III: ICD-10-PCS | Mod: PBBFAC,,, | Performed by: OBSTETRICS & GYNECOLOGY

## 2023-03-07 PROCEDURE — 99999 PR PBB SHADOW E&M-EST. PATIENT-LVL III: CPT | Mod: PBBFAC,,, | Performed by: OBSTETRICS & GYNECOLOGY

## 2023-03-07 PROCEDURE — 1111F PR DISCHARGE MEDS RECONCILED W/ CURRENT OUTPATIENT MED LIST: ICD-10-PCS | Mod: CPTII,,, | Performed by: OBSTETRICS & GYNECOLOGY

## 2023-03-07 PROCEDURE — 99213 OFFICE O/P EST LOW 20 MIN: CPT | Mod: 24,S$PBB,TH, | Performed by: OBSTETRICS & GYNECOLOGY

## 2023-03-07 PROCEDURE — 99213 PR OFFICE/OUTPT VISIT, EST, LEVL III, 20-29 MIN: ICD-10-PCS | Mod: 24,S$PBB,TH, | Performed by: OBSTETRICS & GYNECOLOGY

## 2023-03-07 NOTE — PROGRESS NOTES
"Past medical, surgical, social, family, and obstetric histories; medications; prior records and results; and available outside records were reviewed and updated in the EMR.  Pertinent findings were noted below.    Reason for Visit   Postpartum Care (Delv 23  male)    HPI   37 y.o. female     No LMP recorded.    2023:  with 3c laceration  Still with some pain at laceration site.  No swelling or redness.  No fevers.    Bleeding tapering.  Tolerating PO.  +bowel function.  No breast symptoms  No mood issues    Pap: No result found, No recent documented pap  Mammogram: N/A    Exam   /70   Ht 5' 4" (1.626 m)   Wt 89.5 kg (197 lb 5 oz)   Breastfeeding Yes   BMI 33.87 kg/m²     Physical Exam  Genitourinary:      Vulva normal.      Genitourinary Comments: No erythema or induration.  Laceration intact vaginally.  Rectal exam deferred.      Right Labia: No rash, lesions or Bartholin's cyst.     Left Labia: No lesions, Bartholin's cyst or rash.     No vaginal bleeding.      Pelvic exam was performed with patient in the lithotomy position.   Exam conducted with a chaperone present.     Assessment and Plan   Obstetrical laceration, third degree    Depression screening      Laceration healing well but still with some pain.  Continue pain meds and ice.  RTC for worsening pain, swelling, redness, fever, or foul-smelling discharge.  Will discuss possible pelvic floor PT on RTC.  No mood issues.    "

## 2023-04-04 ENCOUNTER — POSTPARTUM VISIT (OUTPATIENT)
Dept: OBSTETRICS AND GYNECOLOGY | Facility: CLINIC | Age: 38
End: 2023-04-04
Payer: MEDICAID

## 2023-04-04 VITALS
DIASTOLIC BLOOD PRESSURE: 68 MMHG | WEIGHT: 188.25 LBS | SYSTOLIC BLOOD PRESSURE: 108 MMHG | HEIGHT: 64 IN | BODY MASS INDEX: 32.14 KG/M2

## 2023-04-04 DIAGNOSIS — Z86.32 HISTORY OF GESTATIONAL DIABETES: ICD-10-CM

## 2023-04-04 PROCEDURE — 99999 PR PBB SHADOW E&M-EST. PATIENT-LVL II: CPT | Mod: PBBFAC,,, | Performed by: OBSTETRICS & GYNECOLOGY

## 2023-04-04 PROCEDURE — 99212 OFFICE O/P EST SF 10 MIN: CPT | Mod: PBBFAC,TH,PN | Performed by: OBSTETRICS & GYNECOLOGY

## 2023-04-04 PROCEDURE — 99999 PR PBB SHADOW E&M-EST. PATIENT-LVL II: ICD-10-PCS | Mod: PBBFAC,,, | Performed by: OBSTETRICS & GYNECOLOGY

## 2023-04-04 PROCEDURE — 59430 PR CARE AFTER DELIVERY ONLY: ICD-10-PCS | Mod: ,,, | Performed by: OBSTETRICS & GYNECOLOGY

## 2023-04-04 RX ORDER — MULTIVITAMIN
1 TABLET ORAL DAILY
COMMUNITY

## 2023-04-05 PROBLEM — O09.899 RUBELLA NON-IMMUNE STATUS, ANTEPARTUM: Status: RESOLVED | Noted: 2022-09-19 | Resolved: 2023-04-05

## 2023-04-05 PROBLEM — Z28.39 RUBELLA NON-IMMUNE STATUS, ANTEPARTUM: Status: RESOLVED | Noted: 2022-09-19 | Resolved: 2023-04-05

## 2023-04-05 NOTE — PROGRESS NOTES
"Past medical, surgical, social, family, and obstetric histories; medications; prior records and results; and available outside records were reviewed and updated in the EMR.  Pertinent findings were noted below.    Reason for Visit   Postpartum Care    HPI   37 y.o. female     Patient's last menstrual period was 2023.    Delivery: 2023,  WITH 3C LACERATION  Hospitalization: UNCOMPLICATED  Ambulating, tolerating Po, moving bowels: YES  Pain controlled: YES  Bleeding: STOPPED  Breast pain or engorgement: DENIES  Postpartum depression: NO MOOD ISSUES  Incision: NO ISSUES WITH LACERATION SITE    Pap: No result found,  Done at [place] per pt  Mammogram: N/A  Allergies: Penicillins    Exam   /68 (BP Location: Left arm, Patient Position: Sitting)   Ht 5' 4" (1.626 m)   Wt 85.4 kg (188 lb 4.4 oz)   LMP 2023   BMI 32.32 kg/m²     Physical Exam  Genitourinary:      Vulva normal.      Genitourinary Comments: Laceration well healed.  Confirmed intact on rectal exam      Right Labia: No rash, lesions or Bartholin's cyst.     Left Labia: No lesions, Bartholin's cyst or rash.     No vaginal discharge or bleeding.      Pelvic exam was performed with patient in the lithotomy position.   Exam conducted with a chaperone present.     Assessment and Plan   Encounter for routine postpartum follow-up    History of gestational diabetes  -     Glucose Tolerance 2 Hour; Future; Expected date: 2023    3C obstetrical tear      Postpartum  Doing well  Exam: NNORMAL  Mood / depression screening: NO MOOD ISSUES  Lactation referral: PT HAS BEEN WORKING WITH LACTATION  WILL SCHEDULE2-HR GTT  REPAIR WELL HEALED    "

## 2023-04-17 ENCOUNTER — PROCEDURE VISIT (OUTPATIENT)
Dept: OBSTETRICS AND GYNECOLOGY | Facility: CLINIC | Age: 38
End: 2023-04-17
Payer: MEDICAID

## 2023-04-17 DIAGNOSIS — Z86.32 HISTORY OF GESTATIONAL DIABETES: ICD-10-CM

## 2023-04-17 LAB
GLUCOSE SERPL-MCNC: 115 MG/DL
GLUCOSE SERPL-MCNC: 118 MG/DL
GLUCOSE SERPL-MCNC: 82 MG/DL (ref 70–110)

## 2023-04-17 PROCEDURE — 82951 GLUCOSE TOLERANCE TEST (GTT): CPT | Performed by: OBSTETRICS & GYNECOLOGY

## 2023-04-18 NOTE — PROGRESS NOTES
Lab Documentation:    Order Type: Written Order placed in [facility]    Patient in for lab visit only per provider treatment plan.

## 2024-02-27 ENCOUNTER — HOSPITAL ENCOUNTER (EMERGENCY)
Facility: OTHER | Age: 39
Discharge: HOME | End: 2024-02-27
Attending: EMERGENCY MEDICINE
Payer: MEDICAID

## 2024-02-27 VITALS
DIASTOLIC BLOOD PRESSURE: 70 MMHG | OXYGEN SATURATION: 96 % | WEIGHT: 150 LBS | TEMPERATURE: 98 F | RESPIRATION RATE: 18 BRPM | HEART RATE: 80 BPM | BODY MASS INDEX: 25.75 KG/M2 | SYSTOLIC BLOOD PRESSURE: 137 MMHG

## 2024-02-27 DIAGNOSIS — R06.02 SOB (SHORTNESS OF BREATH): ICD-10-CM

## 2024-02-27 DIAGNOSIS — R05.1 ACUTE COUGH: ICD-10-CM

## 2024-02-27 DIAGNOSIS — J20.9 ACUTE BRONCHITIS, UNSPECIFIED ORGANISM: Primary | ICD-10-CM

## 2024-02-27 LAB
B-HCG UR QL: NEGATIVE
CTP QC/QA: YES
POC MOLECULAR INFLUENZA A AGN: NEGATIVE
POC MOLECULAR INFLUENZA B AGN: NEGATIVE
SARS-COV-2 RDRP RESP QL NAA+PROBE: NEGATIVE

## 2024-02-27 PROCEDURE — 94640 AIRWAY INHALATION TREATMENT: CPT

## 2024-02-27 PROCEDURE — 63600175 PHARM REV CODE 636 W HCPCS

## 2024-02-27 PROCEDURE — 96372 THER/PROPH/DIAG INJ SC/IM: CPT

## 2024-02-27 PROCEDURE — 25000003 PHARM REV CODE 250

## 2024-02-27 PROCEDURE — 81025 URINE PREGNANCY TEST: CPT | Performed by: EMERGENCY MEDICINE

## 2024-02-27 PROCEDURE — 87635 SARS-COV-2 COVID-19 AMP PRB: CPT | Performed by: EMERGENCY MEDICINE

## 2024-02-27 PROCEDURE — 99284 EMERGENCY DEPT VISIT MOD MDM: CPT | Mod: 25

## 2024-02-27 PROCEDURE — 25000242 PHARM REV CODE 250 ALT 637 W/ HCPCS

## 2024-02-27 RX ORDER — KETOROLAC TROMETHAMINE 30 MG/ML
10 INJECTION, SOLUTION INTRAMUSCULAR; INTRAVENOUS
Status: COMPLETED | OUTPATIENT
Start: 2024-02-27 | End: 2024-02-27

## 2024-02-27 RX ORDER — IPRATROPIUM BROMIDE AND ALBUTEROL SULFATE 2.5; .5 MG/3ML; MG/3ML
3 SOLUTION RESPIRATORY (INHALATION)
Status: COMPLETED | OUTPATIENT
Start: 2024-02-27 | End: 2024-02-27

## 2024-02-27 RX ORDER — ACETAMINOPHEN 500 MG
1000 TABLET ORAL EVERY 8 HOURS PRN
Qty: 10 TABLET | Refills: 0 | Status: SHIPPED | OUTPATIENT
Start: 2024-02-27 | End: 2024-03-03

## 2024-02-27 RX ORDER — DEXTROMETHORPHAN HBR AND GUAIFENESIN 5; 100 MG/5ML; MG/5ML
10 LIQUID ORAL 2 TIMES DAILY
Qty: 118 ML | Refills: 0 | Status: SHIPPED | OUTPATIENT
Start: 2024-02-27 | End: 2024-03-08

## 2024-02-27 RX ORDER — ALBUTEROL SULFATE 90 UG/1
1-2 AEROSOL, METERED RESPIRATORY (INHALATION) EVERY 6 HOURS PRN
Qty: 6.7 G | Refills: 0 | Status: SHIPPED | OUTPATIENT
Start: 2024-02-27 | End: 2024-03-08

## 2024-02-27 RX ORDER — GUAIFENESIN 600 MG/1
600 TABLET, EXTENDED RELEASE ORAL
Status: COMPLETED | OUTPATIENT
Start: 2024-02-27 | End: 2024-02-27

## 2024-02-27 RX ORDER — DEXAMETHASONE SODIUM PHOSPHATE 4 MG/ML
4 INJECTION, SOLUTION INTRA-ARTICULAR; INTRALESIONAL; INTRAMUSCULAR; INTRAVENOUS; SOFT TISSUE
Status: COMPLETED | OUTPATIENT
Start: 2024-02-27 | End: 2024-02-27

## 2024-02-27 RX ADMIN — DEXAMETHASONE SODIUM PHOSPHATE 4 MG: 4 INJECTION INTRA-ARTICULAR; INTRALESIONAL; INTRAMUSCULAR; INTRAVENOUS; SOFT TISSUE at 10:02

## 2024-02-27 RX ADMIN — KETOROLAC TROMETHAMINE 10 MG: 30 INJECTION INTRAMUSCULAR; INTRAVENOUS at 10:02

## 2024-02-27 RX ADMIN — GUAIFENESIN 600 MG: 600 TABLET, EXTENDED RELEASE ORAL at 09:02

## 2024-02-27 RX ADMIN — IPRATROPIUM BROMIDE AND ALBUTEROL SULFATE 3 ML: 2.5; .5 SOLUTION RESPIRATORY (INHALATION) at 09:02

## 2024-02-28 NOTE — ED TRIAGE NOTES
PT arrived with c/o productive cough and SOB since before Mardi [place].  Pt reports subjective fever and chills in the past few days.  +HA.  Last tylenol 1230 today with minimal relief.  Pt answering questions appropriately, speaking in complete sentences, respirations even and unlabored.  Aao x 4.

## 2024-02-28 NOTE — DISCHARGE INSTRUCTIONS
Please take Tylenol 1000 mg every 8 hours or ibuprofen 600 mg as needed for headache and sore throat. Please take cough syrup twice a day for 10 days. Use albuterol inhaler as needed for SOB or wheezing. Make sure you are drinking plenty of water. You can use Vicks Vapor Rub on your chest.

## 2024-02-28 NOTE — ED PROVIDER NOTES
"Encounter Date: 2/27/2024       History     Chief Complaint   Patient presents with    Cough     Productive cough w/ this yellow sputum & sob upon exertion since [place] progressively worsening.  Attempted otc tylenol today.     Gifty Hernandez is a 38 y.o. female presenting to the emergency department for evaluation of productive cough consisting of "yellow green" sputum for over 2 weeks. Reports the cough has worsened in the past two days. She reports associated SOB today after she walked up some stairs. She has been experiencing headache and sore throat. Notes the sore throat may be attributed to her frequent coughing. States that she had a high grade fever 3 days ago. Reports her son is sick at home as well. She has only taken Tylenol for her symptoms, which has not provided relief. She denies chest pain, chest tightness, palpitations, abdominal pain, nausea, vomiting, diarrhea, urinary symptoms, vaginal bleeding or vaginal discharge.       The history is provided by the patient (mother at bedside).     Review of patient's allergies indicates:   Allergen Reactions    Penicillins Other (See Comments)     Past Medical History:   Diagnosis Date    Gestational diabetes      Past Surgical History:   Procedure Laterality Date    BUNIONECTOMY Right 2011     Family History   Problem Relation Age of Onset    Ovarian cancer Paternal Aunt     Breast cancer Neg Hx     Colon cancer Neg Hx      Social History     Tobacco Use    Smoking status: Never    Smokeless tobacco: Never   Substance Use Topics    Alcohol use: Not Currently    Drug use: Never     Review of Systems   Constitutional:  Positive for fever (resolved). Negative for chills.   HENT:  Negative for congestion, rhinorrhea and sore throat.    Respiratory:  Positive for cough and shortness of breath. Negative for chest tightness.    Cardiovascular:  Negative for chest pain.   Gastrointestinal:  Negative for abdominal pain, diarrhea, nausea and vomiting. "   Genitourinary:  Negative for dysuria, frequency and urgency.   Musculoskeletal:  Negative for back pain.   Skin:  Negative for rash.   Neurological:  Positive for headaches. Negative for dizziness.   Psychiatric/Behavioral:  Negative for confusion.        Physical Exam     Initial Vitals [02/27/24 2038]   BP Pulse Resp Temp SpO2   137/70 85 17 98.4 °F (36.9 °C) 96 %      MAP       --         Physical Exam    Nursing note and vitals reviewed.  Constitutional: She appears well-developed and well-nourished. No distress.   HENT:   Head: Normocephalic and atraumatic.   Right Ear: Tympanic membrane, external ear and ear canal normal.   Left Ear: Tympanic membrane, external ear and ear canal normal.   Nose: Nose normal.   Mouth/Throat: Oropharynx is clear and moist.   Eyes: Conjunctivae and EOM are normal.   Neck: Neck supple.   Normal range of motion.  Cardiovascular:  Normal rate, regular rhythm, normal heart sounds and intact distal pulses.           Pulmonary/Chest: No respiratory distress. She has wheezes. She has no rhonchi. She has no rales. She exhibits no tenderness.   Decreased air movement. Minimal expiratory wheezing at bases.    Musculoskeletal:         General: Normal range of motion.      Cervical back: Normal range of motion and neck supple.     Lymphadenopathy:     She has no cervical adenopathy.   Neurological: She is alert and oriented to person, place, and time. She has normal strength.   Skin: Skin is warm and dry.   Psychiatric: She has a normal mood and affect. Her behavior is normal. Judgment and thought content normal.         ED Course   Procedures  Labs Reviewed   SARS-COV-2 RDRP GENE   POCT INFLUENZA A/B MOLECULAR   POCT URINE PREGNANCY          Imaging Results              X-Ray Chest PA And Lateral (Final result)  Result time 02/27/24 21:45:54      Final result by Woo Lorenz MD (02/27/24 21:45:54)                   Impression:      No acute abnormality.      Electronically signed  "by: Woo Maki  Date:    02/27/2024  Time:    21:45               Narrative:    EXAMINATION:  XR CHEST PA AND LATERAL    CLINICAL HISTORY:  Shortness of breath    TECHNIQUE:  PA and lateral views of the chest were performed.    COMPARISON:  None    FINDINGS:  The lungs are clear, with normal appearance of pulmonary vasculature and no pleural effusion or pneumothorax.    The cardiac silhouette is normal in size. The hilar and mediastinal contours are unremarkable.    Bones are intact.                                    X-Rays:   Independently Interpreted Readings:   Chest X-Ray: No infiltrate, effusion, or pneumothorax. No cardiomegaly.      Medications   guaiFENesin 12 hr tablet 600 mg (600 mg Oral Given 2/27/24 2130)   albuterol-ipratropium 2.5 mg-0.5 mg/3 mL nebulizer solution 3 mL (3 mLs Nebulization Given 2/27/24 2138)   ketorolac injection 9.999 mg (9.999 mg Intramuscular Given 2/27/24 2206)   dexAMETHasone injection 4 mg (4 mg Intramuscular Given 2/27/24 2206)     Medical Decision Making  Amount and/or Complexity of Data Reviewed  Labs: ordered.    Risk  OTC drugs.  Prescription drug management.                          Medical Decision Making:   Initial Assessment:   Urgent evaluation of healthy 30-year-old female presenting with persistent productive cough consisting of "yellow green" sputum for over 2 weeks.  States that she is also experiencing headache and shortness of breath.  She did have a fever a few days ago.  States that her son is sick at home as well.  On exam, she was well-appearing and nontoxic.  Hemodynamically stable.  Afebrile in the ED. coughing during exam.  Decreased air movement. Minimal wheezing at bilateral bases.  No respiratory distress.  Speaking in full sentences.  Plan for rapid swabs and chest x-ray.  Will give antitussive and breathing treatment.  Clinical Tests:   Lab Tests: Ordered and Reviewed  Radiological Study: Ordered and Reviewed  ED Management:  Rapid COVID and " influenza tests are negative today.  UPT is negative.  No acute process on chest x-ray. I updated the patient on all results.  Explained that she has acute bronchitis.  No history of current diabetes.  Patient was given dose of dexamethasone.  Also given dose of Toradol for her headache.  Discharged home with prescription for guaifenesin, albuterol inhaler, and Tylenol.  Also recommended taking ibuprofen as needed for headache and sore throat.  Encouraged her to increase her water intake.  Patient verbalized understanding and agreement with this plan of care. She was given specific return precautions. Advised to follow up with PCP as needed. All questions and concerns addressed. She is stable for discharge.     This note was created with [employer] Direct Dictation. Please excuse any spelling or grammatical errors.             Clinical Impression:  Final diagnoses:  [R06.02] SOB (shortness of breath)  [R05.1] Acute cough  [J20.9] Acute bronchitis, unspecified organism (Primary)          ED Disposition Condition    Discharge Stable          ED Prescriptions       Medication Sig Dispense Start Date End Date Auth. Provider    dextromethorphan-guaiFENesin 5-100 mg/5 mL Liqd Take 10 mLs by mouth 2 (two) times a day. for 10 days 118 mL 2/27/2024 3/8/2024 Jd Ibarra PA-C    albuterol (PROVENTIL/VENTOLIN HFA) 90 mcg/actuation inhaler Inhale 1-2 puffs into the lungs every 6 (six) hours as needed for Wheezing or Shortness of Breath. Rescue 6.7 g 2/27/2024 3/8/2024 Jd Ibarra PA-C    acetaminophen (TYLENOL) 500 MG tablet Take 2 tablets (1,000 mg total) by mouth every 8 (eight) hours as needed for Pain or Temperature greater than (headache). 10 tablet 2/27/2024 3/3/2024 Jd Ibarra PA-C          Follow-up Information    None          Jd Ibarra PA-C  02/28/24 0008

## 2024-02-28 NOTE — FIRST PROVIDER EVALUATION
Emergency Department TeleTriage Encounter Note      CHIEF COMPLAINT    Chief Complaint   Patient presents with    Cough     Productive cough w/ this yellow sputum & sob upon exertion since Mardi [place]as progressively worsening.  Attempted otc tylenol today.       VITAL SIGNS   Initial Vitals [02/27/24 2038]   BP Pulse Resp Temp SpO2   137/70 85 17 98.4 °F (36.9 °C) 96 %      MAP       --            ALLERGIES    Review of patient's allergies indicates:   Allergen Reactions    Penicillins Other (See Comments)       PROVIDER TRIAGE NOTE  TeleTriage Note: Gifty Hernandez, a nontoxic/well appearing, 38 y.o. female, presented to the ED with c/o SOB that began yesterday. Viral symptoms since Mardi [place]as. Speaking in full sentences without respiratory distress. 102.7 fever a few days ago. Denies chest pain.     All ED beds are full at present; patient notified of this status.  Patient seen and medically screened by Nurse Practitioner via teletriage. Orders initiated at triage to expedite care.  Patient is stable to return to the waiting room and will be placed in an ED bed when available.  Care will be transferred to an alternate provider when patient has been placed in an Exam Room from the [facility] for physical exam, additional orders, and disposition.  8:47 PM Shelly Estrada DNP, FNP-C        ORDERS  Labs Reviewed   SARS-COV-2 RDRP GENE   POCT INFLUENZA A/B MOLECULAR   POCT URINE PREGNANCY       ED Orders (720h ago, onward)      Start Ordered     Status Ordering Provider    02/27/24 2049 02/27/24 2049  X-Ray Chest PA And Lateral  1 time imaging         Ordered SHELLY ESTRADA    02/27/24 2043 02/27/24 2042  POCT COVID-19 Rapid Screening  Once         Acknowledged ROBBI MCKAY    02/27/24 2043 02/27/24 2042  POCT Influenza A/B Molecular  Once         Acknowledged ROBBI MCKAY    02/27/24 2043 02/27/24 2042  POCT urine pregnancy  Once        Comments: For women of childbearing age w/o hysterectomy.    Ordered  ROBBI MCKAY.    02/27/24 2043 02/27/24 2042  Notify Provider if unable to obtain within 30 minutes of assessment  Once         Ordered ROBBI MCKAY              Virtual Visit Note: The provider triage portion of this emergency department evaluation and documentation was performed via [employer], a HIPAA-compliant telemedicine application, in concert with a tele-presenter in the room. A face to face patient evaluation with one of my colleagues will occur once the patient is placed in an emergency department room.      DISCLAIMER: This note was prepared with [employer] voice recognition transcription software. Garbled syntax, mangled pronouns, and other bizarre constructions may be attributed to that software system.

## 2025-04-27 ENCOUNTER — HOSPITAL ENCOUNTER (INPATIENT)
Facility: OTHER | Age: 40
LOS: 3 days | Discharge: HOME | DRG: 189 | End: 2025-04-30
Attending: EMERGENCY MEDICINE | Admitting: EMERGENCY MEDICINE
Payer: MEDICAID

## 2025-04-27 DIAGNOSIS — B34.8 RHINOVIRUS INFECTION: ICD-10-CM

## 2025-04-27 DIAGNOSIS — R06.02 SOB (SHORTNESS OF BREATH): Primary | ICD-10-CM

## 2025-04-27 DIAGNOSIS — J45.41 MODERATE PERSISTENT ASTHMA WITH EXACERBATION: ICD-10-CM

## 2025-04-27 LAB
ABSOLUTE EOSINOPHIL (OHS): 0.01 K/UL
ABSOLUTE MONOCYTE (OHS): 0.09 K/UL (ref 0.3–1)
ABSOLUTE NEUTROPHIL COUNT (OHS): 13.18 K/UL (ref 1.8–7.7)
ALBUMIN SERPL BCP-MCNC: 4.1 G/DL (ref 3.5–5.2)
ALP SERPL-CCNC: 56 UNIT/L (ref 40–150)
ALT SERPL W/O P-5'-P-CCNC: 13 UNIT/L (ref 10–44)
ANION GAP (OHS): 12 MMOL/L (ref 8–16)
AST SERPL-CCNC: 16 UNIT/L (ref 11–45)
B-HCG UR QL: NEGATIVE
BASOPHILS # BLD AUTO: 0.03 K/UL
BASOPHILS NFR BLD AUTO: 0.2 %
BILIRUB SERPL-MCNC: 0.6 MG/DL (ref 0.1–1)
BUN SERPL-MCNC: 7 MG/DL (ref 6–20)
CALCIUM SERPL-MCNC: 8.6 MG/DL (ref 8.7–10.5)
CHLORIDE SERPL-SCNC: 110 MMOL/L (ref 95–110)
CO2 SERPL-SCNC: 14 MMOL/L (ref 23–29)
CREAT SERPL-MCNC: 0.8 MG/DL (ref 0.5–1.4)
CTP QC/QA: YES
D DIMER PPP IA.FEU-MCNC: 0.33 MG/L FEU
ERYTHROCYTE [DISTWIDTH] IN BLOOD BY AUTOMATED COUNT: 12.1 % (ref 11.5–14.5)
FIO2: 40
GFR SERPLBLD CREATININE-BSD FMLA CKD-EPI: >60 ML/MIN/1.73/M2
GLUCOSE SERPL-MCNC: 300 MG/DL (ref 70–110)
HCO3 UR-SCNC: 16 MMOL/L (ref 24–28)
HCT VFR BLD AUTO: 36.9 % (ref 37–48.5)
HCT VFR BLD CALC: 39 %PCV (ref 36–54)
HGB BLD-MCNC: 12.8 GM/DL (ref 12–16)
HGB BLD-MCNC: 13 G/DL
IMM GRANULOCYTES # BLD AUTO: 0.04 K/UL (ref 0–0.04)
IMM GRANULOCYTES NFR BLD AUTO: 0.3 % (ref 0–0.5)
LYMPHOCYTES # BLD AUTO: 0.61 K/UL (ref 1–4.8)
MAGNESIUM SERPL-MCNC: 1.8 MG/DL (ref 1.6–2.6)
MCH RBC QN AUTO: 31.8 PG (ref 27–31)
MCHC RBC AUTO-ENTMCNC: 34.7 G/DL (ref 32–36)
MCV RBC AUTO: 92 FL (ref 82–98)
NUCLEATED RBC (/100WBC) (OHS): 0 /100 WBC
PCO2 BLDA: 28.6 MMHG (ref 35–45)
PH SMN: 7.36 [PH] (ref 7.35–7.45)
PLATELET # BLD AUTO: 187 K/UL (ref 150–450)
PMV BLD AUTO: 10.4 FL (ref 9.2–12.9)
PO2 BLDA: 62 MMHG (ref 80–100)
POC BE: -10 MMOL/L (ref -2–2)
POC MOLECULAR INFLUENZA A AGN: NEGATIVE
POC MOLECULAR INFLUENZA B AGN: NEGATIVE
POC SATURATED O2: 91 % (ref 95–100)
POC TCO2: 17 MMOL/L (ref 23–27)
POTASSIUM SERPL-SCNC: 2.6 MMOL/L (ref 3.5–5.1)
PROCALCITONIN SERPL-MCNC: 0.07 NG/ML
PROT SERPL-MCNC: 7.2 GM/DL (ref 6–8.4)
RBC # BLD AUTO: 4.02 M/UL (ref 4–5.4)
RELATIVE EOSINOPHIL (OHS): 0.1 %
RELATIVE LYMPHOCYTE (OHS): 4.4 % (ref 18–48)
RELATIVE MONOCYTE (OHS): 0.6 % (ref 4–15)
RELATIVE NEUTROPHIL (OHS): 94.4 % (ref 38–73)
SAMPLE: ABNORMAL
SARS-COV-2 RDRP RESP QL NAA+PROBE: NEGATIVE
SODIUM SERPL-SCNC: 136 MMOL/L (ref 136–145)
WBC # BLD AUTO: 13.96 K/UL (ref 3.9–12.7)

## 2025-04-27 PROCEDURE — 85379 FIBRIN DEGRADATION QUANT: CPT | Performed by: EMERGENCY MEDICINE

## 2025-04-27 PROCEDURE — 99900035 HC TECH TIME PER 15 MIN (STAT)

## 2025-04-27 PROCEDURE — 96374 THER/PROPH/DIAG INJ IV PUSH: CPT

## 2025-04-27 PROCEDURE — 96361 HYDRATE IV INFUSION ADD-ON: CPT

## 2025-04-27 PROCEDURE — 80053 COMPREHEN METABOLIC PANEL: CPT | Performed by: EMERGENCY MEDICINE

## 2025-04-27 PROCEDURE — 83605 ASSAY OF LACTIC ACID: CPT | Performed by: EMERGENCY MEDICINE

## 2025-04-27 PROCEDURE — 27100107 HC POCKET PEAK FLOW METER

## 2025-04-27 PROCEDURE — 84145 PROCALCITONIN (PCT): CPT | Performed by: EMERGENCY MEDICINE

## 2025-04-27 PROCEDURE — 83735 ASSAY OF MAGNESIUM: CPT | Performed by: EMERGENCY MEDICINE

## 2025-04-27 PROCEDURE — 87635 SARS-COV-2 COVID-19 AMP PRB: CPT | Performed by: EMERGENCY MEDICINE

## 2025-04-27 PROCEDURE — 94660 CPAP INITIATION&MGMT: CPT

## 2025-04-27 PROCEDURE — 25000242 PHARM REV CODE 250 ALT 637 W/ HCPCS: Performed by: EMERGENCY MEDICINE

## 2025-04-27 PROCEDURE — 27000221 HC OXYGEN, UP TO 24 HOURS

## 2025-04-27 PROCEDURE — 93010 ELECTROCARDIOGRAM REPORT: CPT | Mod: ,,, | Performed by: INTERNAL MEDICINE

## 2025-04-27 PROCEDURE — 83880 ASSAY OF NATRIURETIC PEPTIDE: CPT | Performed by: NURSE PRACTITIONER

## 2025-04-27 PROCEDURE — 96375 TX/PRO/DX INJ NEW DRUG ADDON: CPT

## 2025-04-27 PROCEDURE — 63600175 PHARM REV CODE 636 W HCPCS: Performed by: EMERGENCY MEDICINE

## 2025-04-27 PROCEDURE — 94644 CONT INHLJ TX 1ST HOUR: CPT

## 2025-04-27 PROCEDURE — 94645 CONT INHLJ TX EACH ADDL HOUR: CPT

## 2025-04-27 PROCEDURE — 82803 BLOOD GASES ANY COMBINATION: CPT

## 2025-04-27 PROCEDURE — 99291 CRITICAL CARE FIRST HOUR: CPT

## 2025-04-27 PROCEDURE — 27000190 HC CPAP FULL FACE MASK W/VALVE

## 2025-04-27 PROCEDURE — 25000003 PHARM REV CODE 250: Performed by: EMERGENCY MEDICINE

## 2025-04-27 PROCEDURE — 99900031 HC PATIENT EDUCATION (STAT)

## 2025-04-27 PROCEDURE — 87040 BLOOD CULTURE FOR BACTERIA: CPT | Performed by: EMERGENCY MEDICINE

## 2025-04-27 PROCEDURE — 12000002 HC ACUTE/MED SURGE SEMI-PRIVATE ROOM

## 2025-04-27 PROCEDURE — 94761 N-INVAS EAR/PLS OXIMETRY MLT: CPT | Mod: XB

## 2025-04-27 PROCEDURE — 27100171 HC OXYGEN HIGH FLOW UP TO 24 HOURS

## 2025-04-27 PROCEDURE — 94799 UNLISTED PULMONARY SVC/PX: CPT

## 2025-04-27 PROCEDURE — 85025 COMPLETE CBC W/AUTO DIFF WBC: CPT | Performed by: EMERGENCY MEDICINE

## 2025-04-27 PROCEDURE — 96372 THER/PROPH/DIAG INJ SC/IM: CPT | Performed by: EMERGENCY MEDICINE

## 2025-04-27 PROCEDURE — 81025 URINE PREGNANCY TEST: CPT | Performed by: EMERGENCY MEDICINE

## 2025-04-27 PROCEDURE — 36600 WITHDRAWAL OF ARTERIAL BLOOD: CPT

## 2025-04-27 PROCEDURE — 93005 ELECTROCARDIOGRAM TRACING: CPT

## 2025-04-27 RX ORDER — MAGNESIUM SULFATE 1 G/100ML
1 INJECTION INTRAVENOUS ONCE
Status: COMPLETED | OUTPATIENT
Start: 2025-04-27 | End: 2025-04-27

## 2025-04-27 RX ORDER — SODIUM CHLORIDE 0.9 % (FLUSH) 0.9 %
10 SYRINGE (ML) INJECTION
Status: DISCONTINUED | OUTPATIENT
Start: 2025-04-27 | End: 2025-04-30 | Stop reason: HOSPADM

## 2025-04-27 RX ORDER — ALBUTEROL SULFATE 2.5 MG/.5ML
15 SOLUTION RESPIRATORY (INHALATION)
Status: COMPLETED | OUTPATIENT
Start: 2025-04-27 | End: 2025-04-27

## 2025-04-27 RX ORDER — IPRATROPIUM BROMIDE 0.5 MG/2.5ML
1 SOLUTION RESPIRATORY (INHALATION)
Status: COMPLETED | OUTPATIENT
Start: 2025-04-27 | End: 2025-04-27

## 2025-04-27 RX ORDER — METHYLPREDNISOLONE SOD SUCC 125 MG
125 VIAL (EA) INJECTION
Status: COMPLETED | OUTPATIENT
Start: 2025-04-27 | End: 2025-04-27

## 2025-04-27 RX ORDER — EPINEPHRINE 0.3 MG/.3ML
0.3 INJECTION SUBCUTANEOUS
Status: COMPLETED | OUTPATIENT
Start: 2025-04-27 | End: 2025-04-27

## 2025-04-27 RX ADMIN — EPINEPHRINE 0.3 MG: 0.3 INJECTION INTRAMUSCULAR at 09:04

## 2025-04-27 RX ADMIN — ALBUTEROL SULFATE 15 MG: 2.5 SOLUTION RESPIRATORY (INHALATION) at 06:04

## 2025-04-27 RX ADMIN — IPRATROPIUM BROMIDE 1 MG: 0.5 SOLUTION RESPIRATORY (INHALATION) at 06:04

## 2025-04-27 RX ADMIN — SODIUM CHLORIDE 1000 ML: 9 INJECTION, SOLUTION INTRAVENOUS at 09:04

## 2025-04-27 RX ADMIN — IPRATROPIUM BROMIDE 1 MG: 0.5 SOLUTION RESPIRATORY (INHALATION) at 08:04

## 2025-04-27 RX ADMIN — MAGNESIUM SULFATE IN DEXTROSE 1 G: 10 INJECTION, SOLUTION INTRAVENOUS at 09:04

## 2025-04-27 RX ADMIN — METHYLPREDNISOLONE SODIUM SUCCINATE 125 MG: 125 INJECTION, POWDER, FOR SOLUTION INTRAMUSCULAR; INTRAVENOUS at 06:04

## 2025-04-27 RX ADMIN — IPRATROPIUM BROMIDE 1 MG: 0.5 SOLUTION RESPIRATORY (INHALATION) at 09:04

## 2025-04-27 RX ADMIN — ALBUTEROL SULFATE 15 MG: 2.5 SOLUTION RESPIRATORY (INHALATION) at 08:04

## 2025-04-27 RX ADMIN — ALBUTEROL SULFATE 15 MG: 2.5 SOLUTION RESPIRATORY (INHALATION) at 09:04

## 2025-04-27 NOTE — Clinical Note
Diagnosis: SOB (shortness of breath) [873153]   Future Attending Provider: TRUONG CAMACHO [[id number]]   Reason for IP Medical Treatment  (Clinical interventions that can only be accomplished in the IP setting? ) :: sob   Plans for Post-Acute care--if anticipated (pick the single best option):: A. No post acute care anticipated at this time   Special Needs:: No Special Needs [1]

## 2025-04-27 NOTE — ED NOTES
Pt to ED with c/o asthma attack. Pt reporting out of meds at home, SOB progressively worsening since earlier this AM. BS diminished, SpO2 <90% on RA, improved with supplemental O2 via NC. RT and ED MD at bedside.

## 2025-04-27 NOTE — ED PROVIDER NOTES
Encounter Date: 4/27/2025       History     Chief Complaint   Patient presents with    Shortness of Breath     Hx of asthma increased SOB that started last night has been out of inhaler.     Seen by physician at 6:23PM:    Patient is a 39-year-old female who presents to the emergency department with shortness breath.  Patient's shortness breath started at midnight last night, and has been progressively worsening.  She has some associated chest tightness.  She also has a sore throat but does not actually feel sick.  She has a history of asthma since getting COVID 5 years ago.  She has run out of her inhalers. Denies fevers. Denies N/V/D.              Review of patient's allergies indicates:   Allergen Reactions    Penicillins Other (See Comments)     Past Medical History:   Diagnosis Date    Gestational diabetes      Past Surgical History:   Procedure Laterality Date    BUNIONECTOMY Right 2011     Family History   Problem Relation Name Age of Onset    Ovarian cancer Paternal Aunt      Breast cancer Neg Hx      Colon cancer Neg Hx       Social History[1]  Review of Systems   Constitutional:  Negative for chills and fever.   HENT:  Positive for sore throat. Negative for congestion and rhinorrhea.    Respiratory:  Positive for chest tightness, shortness of breath and wheezing.    Cardiovascular:  Negative for chest pain and palpitations.   Gastrointestinal:  Negative for abdominal pain, diarrhea, nausea and vomiting.   Genitourinary:  Negative for dysuria and flank pain.   Musculoskeletal:  Negative for back pain and neck pain.   Skin:  Negative for color change and wound.   Neurological:  Negative for dizziness and headaches.       Physical Exam     Initial Vitals [04/27/25 1827]   BP Pulse Resp Temp SpO2   135/86 (!) 124 (!) 30 -- (!) 88 %      MAP       --         Physical Exam    Nursing note and vitals reviewed.  Constitutional: She appears well-developed and well-nourished.   HENT:   Head: Normocephalic and  atraumatic.   Dry mucous membranes.   Eyes: Conjunctivae are normal.   Neck: Neck supple.   Normal range of motion.  Cardiovascular:  Regular rhythm and normal heart sounds.           Tachycardic   Pulmonary/Chest: She has wheezes. She has no rales.   Increased work of breathing with moderate respiratory distress.  Unable to complete sentences.  Accessory muscle usage.  Prolonged expiration with decreased breath sounds and diffuse wheezing.   Abdominal: Abdomen is soft. Bowel sounds are normal. She exhibits no distension. There is no abdominal tenderness. There is no rebound.   Musculoskeletal:         General: Normal range of motion.      Cervical back: Normal range of motion and neck supple.     Neurological: She is alert and oriented to person, place, and time.   Skin: Skin is warm and dry. Capillary refill takes less than 2 seconds.         ED Course   Critical Care    Date/Time: 4/27/2025 11:46 PM    Performed by: Sari Cameron MD  Authorized by: Sari Cameron MD  Direct patient critical care time: 15 minutes  Additional history critical care time: 5 minutes  Documentation critical care time: 5 minutes  Consulting other physicians critical care time: 5 minutes  Total critical care time (exclusive of procedural time) : 30 minutes  Critical care time was exclusive of separately billable procedures and treating other patients and teaching time.  Critical care was necessary to treat or prevent imminent or life-threatening deterioration of the following conditions: respiratory failure.  Critical care was time spent personally by me on the following activities: evaluation of patient's response to treatment, examination of patient, obtaining history from patient or surrogate, ordering and performing treatments and interventions, ordering and review of laboratory studies, pulse oximetry, re-evaluation of patient's condition, review of old charts and ordering and review of radiographic studies.        Labs  Reviewed   COMPREHENSIVE METABOLIC PANEL - Abnormal       Result Value    Sodium 136      Potassium 2.6 (*)     Chloride 110      CO2 14 (*)     Glucose 300 (*)     BUN 7      Creatinine 0.8      Calcium 8.6 (*)     Protein Total 7.2      Albumin 4.1      Bilirubin Total 0.6      ALP 56      AST 16      ALT 13      Anion Gap 12      eGFR >60     CBC WITH DIFFERENTIAL - Abnormal    WBC 13.96 (*)     RBC 4.02      HGB 12.8      HCT 36.9 (*)     MCV 92      MCH 31.8 (*)     MCHC 34.7      RDW 12.1      Platelet Count 187      MPV 10.4      Nucleated RBC 0      Neut % 94.4 (*)     Lymph % 4.4 (*)     Mono % 0.6 (*)     Eos % 0.1      Basophil % 0.2      Imm Grans % 0.3      Neut # 13.18 (*)     Lymph # 0.61 (*)     Mono # 0.09 (*)     Eos # 0.01      Baso # 0.03      Imm Grans # 0.04     ISTAT PROCEDURE - Abnormal    POC PH 7.356      POC PCO2 28.6 (*)     POC PO2 62 (*)     POC HCO3 16.0 (*)     POC BE -10 (*)     POC SATURATED O2 91      POC TCO2 17 (*)     POC Hematocrit 39      POC HEMOGLOBIN 13      Sample ARTERIAL      FiO2 40     D DIMER, QUANTITATIVE - Normal    D-Dimer 0.33     MAGNESIUM - Normal    Magnesium  1.8     SARS-COV-2 RDRP GENE - Normal    POC Rapid COVID Negative       Acceptable Yes      Narrative:     This test utilizes isothermal nucleic acid amplification technology to detect the SARS-CoV-2 RdRp nucleic acid segment. The analytical sensitivity (limit of detection) is 500 copies/swab.     A POSITIVE result is indicative of the presence of SARS-CoV-2 RNA; clinical correlation with patient history and other diagnostic information is necessary to determine patient infection status.    A NEGATIVE result means that SARS-CoV-2 nucleic acids are not present above the limit of detection. A NEGATIVE result should be treated as presumptive. It does not rule out the possibility of COVID-19 and should not be the sole basis for treatment decisions. If COVID-19 is strongly suspected based on  clinical and exposure history, re-testing using an alternate molecular assay should be considered.     Commercial kits are provided by [employer].        POCT INFLUENZA A/B MOLECULAR - Normal    POC Molecular Influenza A Ag Negative      POC Molecular Influenza B Ag Negative       Acceptable Yes     CULTURE, BLOOD   CULTURE, BLOOD   CBC W/ AUTO DIFFERENTIAL    Narrative:     The following orders were created for panel order CBC auto differential.  Procedure                               Abnormality         Status                     ---------                               -----------         ------                     CBC with Differential[[id number]]       Abnormal            Final result                 Please view results for these tests on the individual orders.   LACTIC ACID, PLASMA   PROCALCITONIN   MAGNESIUM   POCT URINE PREGNANCY    POC Preg Test, Ur Negative       Acceptable Yes       EKG Readings: (Independently Interpreted)   6:31PM:  Rate of 124.  Sinus tachycardia.  Normal axis.  Normal intervals.  No ST or ischemic changes.       Imaging Results              X-Ray Chest AP Portable (Final result)  Result time 04/27/25 19:19:17      Final result by Jesus Raymond MD (04/27/25 19:19:17)                   Impression:      1. No acute cardiopulmonary process.      Electronically signed by: Jesus Raymond MD  Date:    04/27/2025  Time:    19:19               Narrative:    EXAMINATION:  XR CHEST AP PORTABLE    CLINICAL HISTORY:  Shortness of breath    TECHNIQUE:  Single frontal view of the chest was performed.    COMPARISON:  02/27/2024    FINDINGS:  The cardiomediastinal silhouette is not enlarged.  There is no pleural effusion.  The trachea is midline.  The lungs are symmetrically expanded bilaterally without evidence of acute parenchymal process. No large focal consolidation seen.  There is no pneumothorax.  The osseous structures are unremarkable.                                     X-Rays:   Independently Interpreted Readings:   Chest X-Ray: Trachea midline.  No cardiomegaly.  No effusion, infiltrate, edema.     Medications   sodium chloride 0.9% flush 10 mL (has no administration in time range)   potassium bicarbonate disintegrating tablet 40 mEq (has no administration in time range)   albuterol sulfate nebulizer solution 15 mg (15 mg Nebulization Given 4/27/25 1841)   ipratropium 0.02 % nebulizer solution 1 mg (1 mg Nebulization Given 4/27/25 1841)   methylPREDNISolone sodium succinate injection 125 mg (125 mg Intravenous Given 4/27/25 1835)   sodium chloride 0.9% bolus 1,000 mL 1,000 mL (0 mLs Intravenous Stopped 4/27/25 2151)   albuterol sulfate nebulizer solution 15 mg (15 mg Nebulization Given 4/27/25 2026)   ipratropium 0.02 % nebulizer solution 1 mg (1 mg Nebulization Given 4/27/25 2026)   magnesium sulfate in dextrose IVPB (premix) 1 g (0 g Intravenous Stopped 4/27/25 2316)   EPINEPHrine (EPIPEN) 0.3 mg/0.3 mL pen injection 0.3 mg (0.3 mg Intramuscular Given 4/27/25 2154)   albuterol sulfate nebulizer solution 15 mg (15 mg Nebulization Given 4/27/25 2148)   ipratropium 0.02 % nebulizer solution 1 mg (1 mg Nebulization Given 4/27/25 2148)   sodium chloride 0.9% bolus 1,000 mL 1,000 mL (1,000 mLs Intravenous New Bag 4/27/25 2153)     Medical Decision Making  6:23PM :  Patient is a 39-year-old female who presents to the emergency department with shortness breath.  Patient is in moderate respiratory distress with accessory muscle usage.  She is unable to complete sentences.  She is hypoxic and tachycardic in triage.  Will plan for neb treatments, steroids, will continue to follow and reassess.    Amount and/or Complexity of Data Reviewed  External Data Reviewed: notes.  Labs: ordered. Decision-making details documented in ED Course.  Radiology: ordered and independent interpretation performed. Decision-making details documented in ED Course.  ECG/medicine tests:  ordered and independent interpretation performed. Decision-making details documented in ED Course.    Risk  Prescription drug management.  Decision regarding hospitalization.    8:11PM:  Patient's seems improved but still diffusely wheezing with increased work of breathing.  Will plan for additional breathing treatments.  Will continue to follow.    9:54 PM:  Patient continues to have mild-to-moderate respiratory distress despite two rounds of breathing treatments.  She does have improved air movement and she is able to speak in more complete sentences but still in mild-to-moderate distress.  Will plan for Mag and epinephrine along with admission for further observation and management.  I discussed pt with Archie Ivy NP, who is agreeable to plan for admission under Dr. Easton.      11:42 PM:  Pt doing better, she feels improved with the addition of BIPAP and appears more comfortable.  Will continue to follow.                                    Clinical Impression:  Final diagnoses:  [R06.02] SOB (shortness of breath) (Primary)          ED Disposition Condition    Admit                     [1]   Social History  Tobacco Use    Smoking status: Never    Smokeless tobacco: Never   Substance Use Topics    Alcohol use: Not Currently    Drug use: Never        Sari Cameron MD  04/27/25 [id number]

## 2025-04-28 PROBLEM — E87.20 LACTIC ACIDOSIS: Status: ACTIVE | Noted: 2025-04-28

## 2025-04-28 PROBLEM — E87.6 HYPOKALEMIA: Status: ACTIVE | Noted: 2025-04-28

## 2025-04-28 PROBLEM — J96.01 ACUTE HYPOXEMIC RESPIRATORY FAILURE: Status: ACTIVE | Noted: 2025-04-28

## 2025-04-28 LAB
ABSOLUTE EOSINOPHIL (OHS): 0 K/UL
ABSOLUTE MONOCYTE (OHS): 0.15 K/UL (ref 0.3–1)
ABSOLUTE NEUTROPHIL COUNT (OHS): 11.27 K/UL (ref 1.8–7.7)
ADENOVIRUS: NOT DETECTED
ALBUMIN SERPL BCP-MCNC: 3.9 G/DL (ref 3.5–5.2)
ALP SERPL-CCNC: 53 UNIT/L (ref 40–150)
ALT SERPL W/O P-5'-P-CCNC: 14 UNIT/L (ref 10–44)
AMORPH CRY UR QL COMP ASSIST: ABNORMAL
ANION GAP (OHS): 12 MMOL/L (ref 8–16)
AST SERPL-CCNC: 11 UNIT/L (ref 11–45)
B-OH-BUTYR BLD STRIP-SCNC: 0.2 MMOL/L
BACTERIA #/AREA URNS AUTO: ABNORMAL /HPF
BASOPHILS # BLD AUTO: 0.01 K/UL
BASOPHILS NFR BLD AUTO: 0.1 %
BILIRUB SERPL-MCNC: 0.4 MG/DL (ref 0.1–1)
BILIRUB UR QL STRIP.AUTO: NEGATIVE
BNP SERPL-MCNC: 19 PG/ML (ref 0–99)
BORDETELLA PARAPERTUSSIS (IS1001): NOT DETECTED
BORDETELLA PERTUSSIS (PTXP): NOT DETECTED
BUN SERPL-MCNC: 7 MG/DL (ref 6–20)
CALCIUM SERPL-MCNC: 8.1 MG/DL (ref 8.7–10.5)
CHLAMYDIA PNEUMONIAE: NOT DETECTED
CHLORIDE SERPL-SCNC: 110 MMOL/L (ref 95–110)
CLARITY UR: CLEAR
CO2 SERPL-SCNC: 12 MMOL/L (ref 23–29)
COLOR UR AUTO: YELLOW
CORONAVIRUS 229E, COMMON COLD VIRUS: NOT DETECTED
CORONAVIRUS HKU1, COMMON COLD VIRUS: NOT DETECTED
CORONAVIRUS NL63, COMMON COLD VIRUS: NOT DETECTED
CORONAVIRUS OC43, COMMON COLD VIRUS: NOT DETECTED
CREAT SERPL-MCNC: 0.7 MG/DL (ref 0.5–1.4)
EAG (OHS): 114 MG/DL (ref 68–131)
ERYTHROCYTE [DISTWIDTH] IN BLOOD BY AUTOMATED COUNT: 12.4 % (ref 11.5–14.5)
FIO2: 60
FLUBV RNA NPH QL NAA+NON-PROBE: NOT DETECTED
GFR SERPLBLD CREATININE-BSD FMLA CKD-EPI: >60 ML/MIN/1.73/M2
GLUCOSE SERPL-MCNC: 183 MG/DL (ref 70–110)
GLUCOSE UR QL STRIP: ABNORMAL
GRAN CASTS UR QL COMP ASSIST: 3 /LPF (ref ?–0)
HBA1C MFR BLD: 5.6 % (ref 4–5.6)
HCO3 UR-SCNC: 16.3 MMOL/L (ref 24–28)
HCT VFR BLD AUTO: 36 % (ref 37–48.5)
HCT VFR BLD CALC: 35 %PCV (ref 36–54)
HGB BLD-MCNC: 12 G/DL
HGB BLD-MCNC: 12.3 GM/DL (ref 12–16)
HGB UR QL STRIP: NEGATIVE
HOLD SPECIMEN: NORMAL
HPIV1 RNA NPH QL NAA+NON-PROBE: NOT DETECTED
HPIV2 RNA NPH QL NAA+NON-PROBE: NOT DETECTED
HPIV3 RNA NPH QL NAA+NON-PROBE: NOT DETECTED
HPIV4 RNA NPH QL NAA+NON-PROBE: NOT DETECTED
HUMAN METAPNEUMOVIRUS: NOT DETECTED
HYALINE CASTS UR QL AUTO: 1 /LPF (ref 0–1)
IMM GRANULOCYTES # BLD AUTO: 0.05 K/UL (ref 0–0.04)
IMM GRANULOCYTES NFR BLD AUTO: 0.4 % (ref 0–0.5)
INFLUENZA A: NOT DETECTED
KETONES UR QL STRIP: ABNORMAL
LACTATE SERPL-SCNC: 1.4 MMOL/L (ref 0.5–2.2)
LACTATE SERPL-SCNC: 5.2 MMOL/L (ref 0.5–2.2)
LACTATE SERPL-SCNC: 5.5 MMOL/L (ref 0.5–2.2)
LEUKOCYTE ESTERASE UR QL STRIP: NEGATIVE
LYMPHOCYTES # BLD AUTO: 0.38 K/UL (ref 1–4.8)
MAGNESIUM SERPL-MCNC: 1.9 MG/DL (ref 1.6–2.6)
MAGNESIUM SERPL-MCNC: 2 MG/DL (ref 1.6–2.6)
MCH RBC QN AUTO: 32.3 PG (ref 27–31)
MCHC RBC AUTO-ENTMCNC: 34.2 G/DL (ref 32–36)
MCV RBC AUTO: 95 FL (ref 82–98)
MICROSCOPIC COMMENT: ABNORMAL
MYCOPLASMA PNEUMONIAE: NOT DETECTED
NITRITE UR QL STRIP: NEGATIVE
NUCLEATED RBC (/100WBC) (OHS): 0 /100 WBC
OHS QRS DURATION: 80 MS
OHS QTC CALCULATION: 425 MS
PCO2 BLDA: 31.1 MMHG (ref 35–45)
PH SMN: 7.33 [PH] (ref 7.35–7.45)
PH UR STRIP: 6 [PH]
PHOSPHATE SERPL-MCNC: 1.6 MG/DL (ref 2.7–4.5)
PLATELET # BLD AUTO: 177 K/UL (ref 150–450)
PMV BLD AUTO: 10.2 FL (ref 9.2–12.9)
PO2 BLDA: 167 MMHG (ref 80–100)
POC BE: -10 MMOL/L (ref -2–2)
POC SATURATED O2: 99 % (ref 95–100)
POC TCO2: 17 MMOL/L (ref 23–27)
POCT GLUCOSE: 104 MG/DL (ref 70–110)
POCT GLUCOSE: 109 MG/DL (ref 70–110)
POCT GLUCOSE: 160 MG/DL (ref 70–110)
POCT GLUCOSE: 175 MG/DL (ref 70–110)
POTASSIUM SERPL-SCNC: 3.8 MMOL/L (ref 3.5–5.1)
PROT SERPL-MCNC: 7.2 GM/DL (ref 6–8.4)
PROT UR QL STRIP: NEGATIVE
RBC # BLD AUTO: 3.81 M/UL (ref 4–5.4)
RBC #/AREA URNS AUTO: 5 /HPF (ref 0–4)
RELATIVE EOSINOPHIL (OHS): 0 %
RELATIVE LYMPHOCYTE (OHS): 3.2 % (ref 18–48)
RELATIVE MONOCYTE (OHS): 1.3 % (ref 4–15)
RELATIVE NEUTROPHIL (OHS): 95 % (ref 38–73)
RESPIRATORY INFECTION PANEL SOURCE: ABNORMAL
RSV RNA NPH QL NAA+NON-PROBE: NOT DETECTED
RV+EV RNA NPH QL NAA+NON-PROBE: DETECTED
SAMPLE: ABNORMAL
SARS-COV-2 RNA RESP QL NAA+PROBE: NOT DETECTED
SODIUM SERPL-SCNC: 134 MMOL/L (ref 136–145)
SP GR UR STRIP: 1.02
TSH SERPL-ACNC: 0.64 UIU/ML (ref 0.4–4)
UROBILINOGEN UR STRIP-ACNC: NEGATIVE EU/DL
WBC # BLD AUTO: 11.86 K/UL (ref 3.9–12.7)
WBC #/AREA URNS AUTO: 4 /HPF (ref 0–5)
YEAST UR QL AUTO: ABNORMAL /HPF

## 2025-04-28 PROCEDURE — 20000000 HC ICU ROOM

## 2025-04-28 PROCEDURE — 81003 URINALYSIS AUTO W/O SCOPE: CPT | Performed by: NURSE PRACTITIONER

## 2025-04-28 PROCEDURE — 84443 ASSAY THYROID STIM HORMONE: CPT | Performed by: STUDENT IN AN ORGANIZED HEALTH CARE EDUCATION/TRAINING PROGRAM

## 2025-04-28 PROCEDURE — 63600175 PHARM REV CODE 636 W HCPCS: Performed by: STUDENT IN AN ORGANIZED HEALTH CARE EDUCATION/TRAINING PROGRAM

## 2025-04-28 PROCEDURE — 36600 WITHDRAWAL OF ARTERIAL BLOOD: CPT

## 2025-04-28 PROCEDURE — 25000242 PHARM REV CODE 250 ALT 637 W/ HCPCS: Performed by: STUDENT IN AN ORGANIZED HEALTH CARE EDUCATION/TRAINING PROGRAM

## 2025-04-28 PROCEDURE — 99223 1ST HOSP IP/OBS HIGH 75: CPT | Mod: ,,, | Performed by: INTERNAL MEDICINE

## 2025-04-28 PROCEDURE — 82010 KETONE BODYS QUAN: CPT | Performed by: STUDENT IN AN ORGANIZED HEALTH CARE EDUCATION/TRAINING PROGRAM

## 2025-04-28 PROCEDURE — 83735 ASSAY OF MAGNESIUM: CPT | Performed by: NURSE PRACTITIONER

## 2025-04-28 PROCEDURE — 87070 CULTURE OTHR SPECIMN AEROBIC: CPT | Performed by: STUDENT IN AN ORGANIZED HEALTH CARE EDUCATION/TRAINING PROGRAM

## 2025-04-28 PROCEDURE — 83036 HEMOGLOBIN GLYCOSYLATED A1C: CPT | Performed by: STUDENT IN AN ORGANIZED HEALTH CARE EDUCATION/TRAINING PROGRAM

## 2025-04-28 PROCEDURE — 0202U NFCT DS 22 TRGT SARS-COV-2: CPT | Performed by: STUDENT IN AN ORGANIZED HEALTH CARE EDUCATION/TRAINING PROGRAM

## 2025-04-28 PROCEDURE — 99900035 HC TECH TIME PER 15 MIN (STAT)

## 2025-04-28 PROCEDURE — 27000221 HC OXYGEN, UP TO 24 HOURS

## 2025-04-28 PROCEDURE — 25000003 PHARM REV CODE 250: Performed by: STUDENT IN AN ORGANIZED HEALTH CARE EDUCATION/TRAINING PROGRAM

## 2025-04-28 PROCEDURE — 83605 ASSAY OF LACTIC ACID: CPT | Performed by: NURSE PRACTITIONER

## 2025-04-28 PROCEDURE — 80053 COMPREHEN METABOLIC PANEL: CPT | Performed by: NURSE PRACTITIONER

## 2025-04-28 PROCEDURE — 84100 ASSAY OF PHOSPHORUS: CPT | Performed by: NURSE PRACTITIONER

## 2025-04-28 PROCEDURE — 25000003 PHARM REV CODE 250: Performed by: EMERGENCY MEDICINE

## 2025-04-28 PROCEDURE — 94640 AIRWAY INHALATION TREATMENT: CPT

## 2025-04-28 PROCEDURE — 83605 ASSAY OF LACTIC ACID: CPT | Performed by: STUDENT IN AN ORGANIZED HEALTH CARE EDUCATION/TRAINING PROGRAM

## 2025-04-28 PROCEDURE — 63600175 PHARM REV CODE 636 W HCPCS: Performed by: NURSE PRACTITIONER

## 2025-04-28 PROCEDURE — 82803 BLOOD GASES ANY COMBINATION: CPT

## 2025-04-28 PROCEDURE — 36415 COLL VENOUS BLD VENIPUNCTURE: CPT | Performed by: NURSE PRACTITIONER

## 2025-04-28 PROCEDURE — 5A09357 ASSISTANCE WITH RESPIRATORY VENTILATION, LESS THAN 24 CONSECUTIVE HOURS, CONTINUOUS POSITIVE AIRWAY PRESSURE: ICD-10-PCS | Performed by: HOSPITALIST

## 2025-04-28 PROCEDURE — 25500020 PHARM REV CODE 255: Performed by: STUDENT IN AN ORGANIZED HEALTH CARE EDUCATION/TRAINING PROGRAM

## 2025-04-28 PROCEDURE — 85025 COMPLETE CBC W/AUTO DIFF WBC: CPT | Performed by: NURSE PRACTITIONER

## 2025-04-28 PROCEDURE — 25000242 PHARM REV CODE 250 ALT 637 W/ HCPCS: Performed by: NURSE PRACTITIONER

## 2025-04-28 PROCEDURE — 94761 N-INVAS EAR/PLS OXIMETRY MLT: CPT | Mod: XB

## 2025-04-28 PROCEDURE — 36415 COLL VENOUS BLD VENIPUNCTURE: CPT | Performed by: STUDENT IN AN ORGANIZED HEALTH CARE EDUCATION/TRAINING PROGRAM

## 2025-04-28 PROCEDURE — A4216 STERILE WATER/SALINE, 10 ML: HCPCS | Performed by: EMERGENCY MEDICINE

## 2025-04-28 RX ORDER — LORAZEPAM 2 MG/ML
2 INJECTION INTRAMUSCULAR ONCE
Status: COMPLETED | OUTPATIENT
Start: 2025-04-28 | End: 2025-04-28

## 2025-04-28 RX ORDER — MUPIROCIN 20 MG/G
OINTMENT TOPICAL 2 TIMES DAILY
Status: DISCONTINUED | OUTPATIENT
Start: 2025-04-28 | End: 2025-04-30 | Stop reason: HOSPADM

## 2025-04-28 RX ORDER — CEFTRIAXONE 1 G/1
1 INJECTION, POWDER, FOR SOLUTION INTRAMUSCULAR; INTRAVENOUS
Status: DISCONTINUED | OUTPATIENT
Start: 2025-04-28 | End: 2025-04-30 | Stop reason: HOSPADM

## 2025-04-28 RX ORDER — IBUPROFEN 200 MG
24 TABLET ORAL
Status: DISCONTINUED | OUTPATIENT
Start: 2025-04-28 | End: 2025-04-28

## 2025-04-28 RX ORDER — VANCOMYCIN 1.75 GRAM/500 ML IN 0.9 % SODIUM CHLORIDE INTRAVENOUS
20 ONCE
Status: DISCONTINUED | OUTPATIENT
Start: 2025-04-28 | End: 2025-04-28

## 2025-04-28 RX ORDER — ENOXAPARIN SODIUM 100 MG/ML
1 INJECTION SUBCUTANEOUS EVERY 12 HOURS
Status: DISCONTINUED | OUTPATIENT
Start: 2025-04-28 | End: 2025-04-28

## 2025-04-28 RX ORDER — CETIRIZINE HYDROCHLORIDE 5 MG/1
5 TABLET ORAL DAILY
Status: DISCONTINUED | OUTPATIENT
Start: 2025-04-28 | End: 2025-04-30 | Stop reason: HOSPADM

## 2025-04-28 RX ORDER — ENOXAPARIN SODIUM 100 MG/ML
40 INJECTION SUBCUTANEOUS EVERY 24 HOURS
Status: DISCONTINUED | OUTPATIENT
Start: 2025-04-28 | End: 2025-04-30 | Stop reason: HOSPADM

## 2025-04-28 RX ORDER — ENOXAPARIN SODIUM 100 MG/ML
40 INJECTION SUBCUTANEOUS EVERY 24 HOURS
Status: DISCONTINUED | OUTPATIENT
Start: 2025-04-28 | End: 2025-04-28

## 2025-04-28 RX ORDER — ENOXAPARIN SODIUM 100 MG/ML
40 INJECTION SUBCUTANEOUS EVERY 24 HOURS
Status: DISCONTINUED | OUTPATIENT
Start: 2025-04-29 | End: 2025-04-28

## 2025-04-28 RX ORDER — FLUTICASONE FUROATE AND VILANTEROL 200; 25 UG/1; UG/1
1 POWDER RESPIRATORY (INHALATION) DAILY
Status: DISCONTINUED | OUTPATIENT
Start: 2025-04-28 | End: 2025-04-30 | Stop reason: HOSPADM

## 2025-04-28 RX ORDER — METHYLPREDNISOLONE SOD SUCC 125 MG
125 VIAL (EA) INJECTION ONCE
Status: COMPLETED | OUTPATIENT
Start: 2025-04-28 | End: 2025-04-28

## 2025-04-28 RX ORDER — GLUCAGON 1 MG
1 KIT INJECTION
Status: DISCONTINUED | OUTPATIENT
Start: 2025-04-28 | End: 2025-04-30 | Stop reason: HOSPADM

## 2025-04-28 RX ORDER — NALOXONE HCL 0.4 MG/ML
0.02 VIAL (ML) INJECTION
Status: DISCONTINUED | OUTPATIENT
Start: 2025-04-28 | End: 2025-04-30 | Stop reason: HOSPADM

## 2025-04-28 RX ORDER — IBUPROFEN 200 MG
16 TABLET ORAL
Status: DISCONTINUED | OUTPATIENT
Start: 2025-04-28 | End: 2025-04-28

## 2025-04-28 RX ORDER — IPRATROPIUM BROMIDE AND ALBUTEROL SULFATE 2.5; .5 MG/3ML; MG/3ML
3 SOLUTION RESPIRATORY (INHALATION)
Status: DISCONTINUED | OUTPATIENT
Start: 2025-04-28 | End: 2025-04-29

## 2025-04-28 RX ORDER — GLUCAGON 1 MG
1 KIT INJECTION
Status: DISCONTINUED | OUTPATIENT
Start: 2025-04-28 | End: 2025-04-28

## 2025-04-28 RX ORDER — IBUPROFEN 200 MG
16 TABLET ORAL
Status: DISCONTINUED | OUTPATIENT
Start: 2025-04-28 | End: 2025-04-30 | Stop reason: HOSPADM

## 2025-04-28 RX ORDER — IPRATROPIUM BROMIDE AND ALBUTEROL SULFATE 2.5; .5 MG/3ML; MG/3ML
3 SOLUTION RESPIRATORY (INHALATION) EVERY 4 HOURS
Status: DISCONTINUED | OUTPATIENT
Start: 2025-04-28 | End: 2025-04-28

## 2025-04-28 RX ORDER — ONDANSETRON HYDROCHLORIDE 2 MG/ML
4 INJECTION, SOLUTION INTRAVENOUS EVERY 8 HOURS PRN
Status: DISCONTINUED | OUTPATIENT
Start: 2025-04-28 | End: 2025-04-30 | Stop reason: HOSPADM

## 2025-04-28 RX ORDER — SODIUM CHLORIDE 0.9 % (FLUSH) 0.9 %
10 SYRINGE (ML) INJECTION EVERY 8 HOURS PRN
Status: DISCONTINUED | OUTPATIENT
Start: 2025-04-28 | End: 2025-04-28

## 2025-04-28 RX ORDER — SODIUM CHLORIDE, SODIUM LACTATE, POTASSIUM CHLORIDE, CALCIUM CHLORIDE 600; 310; 30; 20 MG/100ML; MG/100ML; MG/100ML; MG/100ML
INJECTION, SOLUTION INTRAVENOUS CONTINUOUS
Status: DISCONTINUED | OUTPATIENT
Start: 2025-04-28 | End: 2025-04-28

## 2025-04-28 RX ORDER — IBUPROFEN 200 MG
24 TABLET ORAL
Status: DISCONTINUED | OUTPATIENT
Start: 2025-04-28 | End: 2025-04-30 | Stop reason: HOSPADM

## 2025-04-28 RX ORDER — INSULIN ASPART 100 [IU]/ML
0-5 INJECTION, SOLUTION INTRAVENOUS; SUBCUTANEOUS
Status: DISCONTINUED | OUTPATIENT
Start: 2025-04-28 | End: 2025-04-30 | Stop reason: HOSPADM

## 2025-04-28 RX ORDER — ACETAMINOPHEN 325 MG/1
650 TABLET ORAL EVERY 4 HOURS PRN
Status: DISCONTINUED | OUTPATIENT
Start: 2025-04-28 | End: 2025-04-30 | Stop reason: HOSPADM

## 2025-04-28 RX ORDER — SODIUM,POTASSIUM PHOSPHATES 280-250MG
1 POWDER IN PACKET (EA) ORAL ONCE
Status: DISCONTINUED | OUTPATIENT
Start: 2025-04-28 | End: 2025-04-28

## 2025-04-28 RX ADMIN — PREDNISONE 50 MG: 50 TABLET ORAL at 08:04

## 2025-04-28 RX ADMIN — CETIRIZINE HYDROCHLORIDE 5 MG: 5 TABLET ORAL at 10:04

## 2025-04-28 RX ADMIN — Medication 10 ML: at 12:04

## 2025-04-28 RX ADMIN — IPRATROPIUM BROMIDE AND ALBUTEROL SULFATE 3 ML: 2.5; .5 SOLUTION RESPIRATORY (INHALATION) at 03:04

## 2025-04-28 RX ADMIN — IPRATROPIUM BROMIDE AND ALBUTEROL SULFATE 3 ML: 2.5; .5 SOLUTION RESPIRATORY (INHALATION) at 07:04

## 2025-04-28 RX ADMIN — IPRATROPIUM BROMIDE AND ALBUTEROL SULFATE 3 ML: 2.5; .5 SOLUTION RESPIRATORY (INHALATION) at 11:04

## 2025-04-28 RX ADMIN — ENOXAPARIN SODIUM 40 MG: 40 INJECTION SUBCUTANEOUS at 06:04

## 2025-04-28 RX ADMIN — METHYLPREDNISOLONE SODIUM SUCCINATE 125 MG: 125 INJECTION, POWDER, FOR SOLUTION INTRAMUSCULAR; INTRAVENOUS at 02:04

## 2025-04-28 RX ADMIN — ENOXAPARIN SODIUM 90 MG: 100 INJECTION SUBCUTANEOUS at 02:04

## 2025-04-28 RX ADMIN — IOHEXOL 100 ML: 350 INJECTION, SOLUTION INTRAVENOUS at 03:04

## 2025-04-28 RX ADMIN — MUPIROCIN: 20 OINTMENT TOPICAL at 09:04

## 2025-04-28 RX ADMIN — LORAZEPAM 2 MG: 2 INJECTION INTRAMUSCULAR; INTRAVENOUS at 03:04

## 2025-04-28 RX ADMIN — POTASSIUM BICARBONATE 40 MEQ: 391 TABLET, EFFERVESCENT ORAL at 12:04

## 2025-04-28 RX ADMIN — FLUTICASONE FUROATE AND VILANTEROL TRIFENATATE 1 PUFF: 200; 25 POWDER RESPIRATORY (INHALATION) at 12:04

## 2025-04-28 RX ADMIN — MUPIROCIN: 20 OINTMENT TOPICAL at 08:04

## 2025-04-28 RX ADMIN — AZITHROMYCIN MONOHYDRATE 500 MG: 500 INJECTION, POWDER, LYOPHILIZED, FOR SOLUTION INTRAVENOUS at 07:04

## 2025-04-28 RX ADMIN — SODIUM PHOSPHATE, MONOBASIC, MONOHYDRATE AND SODIUM PHOSPHATE, DIBASIC, ANHYDROUS 30 MMOL: 142; 276 INJECTION, SOLUTION INTRAVENOUS at 07:04

## 2025-04-28 RX ADMIN — SODIUM CHLORIDE, POTASSIUM CHLORIDE, SODIUM LACTATE AND CALCIUM CHLORIDE: 600; 310; 30; 20 INJECTION, SOLUTION INTRAVENOUS at 03:04

## 2025-04-28 RX ADMIN — IPRATROPIUM BROMIDE AND ALBUTEROL SULFATE 3 ML: 2.5; .5 SOLUTION RESPIRATORY (INHALATION) at 08:04

## 2025-04-28 RX ADMIN — CEFTRIAXONE 1 G: 1 INJECTION, POWDER, FOR SOLUTION INTRAMUSCULAR; INTRAVENOUS at 02:04

## 2025-04-28 NOTE — H&P
[facility] Intensive [facility] Medicine  History & Physical    Patient Name: Gifty Hernandez  MRN: [id number]  Patient Class: IP- Inpatient  Admission Date: 4/27/2025  Attending Physician: Fidencio Ascencio MD   Primary Care Provider: Winifred Primary Doctor         Patient information was obtained from patient, past medical records, and ER records.     Subjective:     Principal Problem:Acute hypoxemic respiratory failure    Chief Complaint:   Chief Complaint   Patient presents with    Shortness of Breath     Hx of asthma increased SOB that started last night has been out of inhaler.        HPI: The patient is a 39-year-old female with a past medical history of asthma who presents with shortness breath. Patient's shortness breath started at midnight last night, and has been progressively worsening. She has some associated chest tightness. She also has a sore throat but does not actually feel sick. While in the ED, her shortness of breath continued to worsen requiring initiation of Bipap.  She will be admitted for acute respiratory failure with hypoxia.    Past Medical History:   Diagnosis Date    Gestational diabetes        Past Surgical History:   Procedure Laterality Date    BUNIONECTOMY Right 2011       Review of patient's allergies indicates:   Allergen Reactions    Penicillins Other (See Comments)       No current facility-administered medications on file prior to encounter.     Current Outpatient Medications on File Prior to Encounter   Medication Sig    albuterol (PROVENTIL/VENTOLIN HFA) 90 mcg/actuation inhaler Inhale 1-2 puffs into the lungs every 6 (six) hours as needed for Wheezing or Shortness of Breath. Rescue    loratadine (CLARITIN) 10 mg tablet Take 10 mg by mouth once daily.    multivitamin (ONE DAILY MULTIVITAMIN) per tablet Take 1 tablet by mouth once daily. Prenatal vit     Family History       Problem Relation (Age of Onset)    Ovarian cancer Paternal Aunt          Tobacco Use    Smoking  status: Never    Smokeless tobacco: Never   Substance and Sexual Activity    Alcohol use: Not Currently    Drug use: Never    Sexual activity: Not Currently     Partners: Male     Birth control/protection: None     Review of Systems   Constitutional:  Positive for activity change. Negative for appetite change and fever.   HENT:  Negative for congestion, ear pain, rhinorrhea and sinus pressure.    Eyes:  Negative for pain and discharge.   Respiratory:  Positive for chest tightness, shortness of breath and wheezing. Negative for cough.    Cardiovascular:  Negative for chest pain and leg swelling.   Gastrointestinal:  Negative for abdominal distention, abdominal pain, diarrhea, nausea and vomiting.   Endocrine: Negative for cold intolerance and heat intolerance.   Genitourinary:  Negative for difficulty urinating, flank pain, frequency, hematuria and urgency.   Musculoskeletal:  Negative for arthralgias, joint swelling and myalgias.   Allergic/Immunologic: Negative for environmental allergies and food allergies.   Neurological:  Negative for dizziness, weakness, light-headedness and headaches.   Hematological:  Does not bruise/bleed easily.   Psychiatric/Behavioral:  Negative for agitation, behavioral problems and decreased concentration.      Objective:     Vital Signs (Most Recent):  Temp:  (UTO) (04/27/25 1827)  Pulse: (!) 127 (04/28/25 0000)  Resp: (S)  (on BiPAP) (04/28/25 0024)  BP: (!) 126/56 (04/28/25 0000)  SpO2: 98 % (04/28/25 0000) Vital Signs (24h Range):  Pulse:  [116-147] 127  Resp:  [22-30] 25  SpO2:  [87 %-98 %] 98 %  BP: (108-135)/(53-86) 126/56        There is no height or weight on file to calculate BMI.     Physical Exam  Constitutional:       Appearance: She is well-developed. She is ill-appearing.   HENT:      Head: Normocephalic.   Eyes:      General:         Right eye: No discharge.         Left eye: No discharge.      Conjunctiva/sclera: Conjunctivae normal.   Cardiovascular:      Rate and  Rhythm: Regular rhythm. Tachycardia present.      Pulses:           Radial pulses are 2+ on the right side and 2+ on the left side.      Heart sounds: Normal heart sounds.   Pulmonary:      Effort: Tachypnea and respiratory distress present.      Breath sounds: Examination of the right-upper field reveals wheezing. Examination of the left-upper field reveals wheezing. Examination of the right-middle field reveals wheezing. Examination of the left-middle field reveals wheezing. Wheezing present.   Abdominal:      General: Bowel sounds are normal. There is no distension.      Palpations: Abdomen is soft.      Tenderness: There is no abdominal tenderness.   Musculoskeletal:         General: Normal range of motion.      Cervical back: Normal range of motion and neck supple.   Skin:     General: Skin is warm and dry.   Neurological:      Mental Status: She is alert and oriented to person, place, and time.      GCS: GCS eye subscore is 4. GCS verbal subscore is 5. GCS motor subscore is 6.      Motor: Motor function is intact.   Psychiatric:         Mood and Affect: Mood normal.         Speech: Speech normal.         Behavior: Behavior normal.         Thought Content: Thought content normal.                Significant Labs: All pertinent labs within the past 24 hours have been reviewed.  CBC:   Recent Labs   Lab 04/27/25  2141 04/27/25  2223   WBC  --  13.96*   HGB  --  12.8   HCT 39 36.9*   PLT  --  187     CMP:   Recent Labs   Lab 04/27/25  2223      K 2.6*      CO2 14*   BUN 7   CREATININE 0.8   CALCIUM 8.6*   ALBUMIN 4.1   BILITOT 0.6   ALKPHOS 56   AST 16   ALT 13   ANIONGAP 12       Significant Imaging: I have reviewed all pertinent imaging results/findings within the past 24 hours.  Assessment/Plan:     Assessment & Plan  Acute hypoxemic respiratory failure  Patient with Hypoxic Respiratory failure which is Acute.  she is not on home oxygen. Supplemental oxygen was provided and noted- Oxygen  Concentration (%):  [50-65] 65    .   Signs/symptoms of respiratory failure include- tachypnea, increased work of breathing, respiratory distress, use of accessory muscles, and wheezing. Contributing diagnoses includes -  None  Labs and images were reviewed. Patient Has recent ABG, which has been reviewed. Will treat underlying causes and adjust management of respiratory failure as follows- Duonebs, abx, prednisone, Pulm/CC consult  Hypokalemia  Patient's most recent potassium results are listed below.   Recent Labs     04/27/25  2223   K 2.6*     Plan  - Replete K PRn  - Monitor potassium Daily  - Patient's hypokalemia is  unchanged    VTE Risk Mitigation (From admission, onward)           Ordered     enoxaparin injection 40 mg  Every 24 hours         04/28/25 0056     IP VTE LOW RISK PATIENT  Once         04/27/25 2155     Place sequential compression device  Until discontinued         04/27/25 2155                                    Freeman Ivy NP  Department of Hospital Medicine  [facility], [place], operated [facility] - Intensive Care ([place])

## 2025-04-28 NOTE — ASSESSMENT & PLAN NOTE
Patient's most recent potassium results are listed below.   Recent Labs     04/27/25  2223   K 2.6*     Plan  - Replete K PRn  - Monitor potassium Daily  - Patient's hypokalemia is unchanged

## 2025-04-28 NOTE — SUBJECTIVE & OBJECTIVE
Interval History: On BIPAP overnight. Off BIPAP in AM. Still with some SOB. Anxiety may be playing component as well.    Review of Systems   Constitutional:  Positive for activity change. Negative for appetite change and fever.   HENT:  Negative for congestion, ear pain, rhinorrhea and sinus pressure.    Respiratory:  Positive for chest tightness, shortness of breath and wheezing. Negative for cough.    Cardiovascular:  Negative for chest pain and leg swelling.   Gastrointestinal:  Negative for abdominal distention, abdominal pain, diarrhea, nausea and vomiting.   Musculoskeletal:  Negative for arthralgias, joint swelling and myalgias.   Neurological:  Negative for dizziness, weakness, light-headedness and headaches.   Psychiatric/Behavioral:  Negative for agitation.      Objective:     Vital Signs (Most Recent):  Temp: 98.1 °F (36.7 °C) (04/28/25 1107)  Pulse: 92 (04/28/25 1237)  Resp: 20 (04/28/25 1237)  BP: (!) 113/55 (04/28/25 1200)  SpO2: (!) 94 % (04/28/25 1200) Vital Signs (24h Range):  Temp:  [98.1 °F (36.7 °C)-99.3 °F (37.4 °C)] 98.1 °F (36.7 °C)  Pulse:  [] 92  Resp:  [18-41] 20  SpO2:  [87 %-98 %] 94 %  BP: ()/(53-86) 113/55     Weight: 87.7 kg (193 lb 5.5 oz)  Body mass index is 33.19 kg/m².    Intake/Output Summary (Last 24 hours) at 4/28/2025 1251  Last data filed at 4/28/2025 1235  Gross per 24 hour   Intake 3684.49 ml   Output 2550 ml   Net 1134.49 ml         Physical Exam  Constitutional:       General: She is not in acute distress.     Comments: Anxious   Pulmonary:      Breath sounds: No wheezing.      Comments: SOB   Abdominal:      General: There is no distension.      Tenderness: There is no abdominal tenderness.   Neurological:      General: No focal deficit present.      Mental Status: She is oriented to person, place, and time.               Significant Labs: All pertinent labs within the past 24 hours have been reviewed.    Significant Imaging: I have reviewed all pertinent  imaging results/findings within the past 24 hours.

## 2025-04-28 NOTE — HPI
39 year old female with history of asthma that presents to the ED for evaluation of 2 days shortness of breath, chest tightness and wheezing. Patient discloses no cough, nasal congestion, fever or chills. Patient does not smoke, no alcohol or illicit drug use. On presentation patient has low oxygen sat in significant respiratory distress. Labs remarkable for leukocytosis, hypokalemia, elevated lactic acid and blood glucose. Of note, patient has been taking inhale epinephrine at home. CTA chest showed no PE but there is mild patchy ground glass infiltrates of the left upper lobe, pulmonary nodules (13.6 mm) and thyroid nodules. Patient was placed on BiPAP, started on empiric antibiotics, bronchodilators and admitted to ICU. Pulmonary consulted for management.

## 2025-04-28 NOTE — EICU
Intervention Initiated From:  [facility] / [facility]    Rose Marie intervened regarding:  Rounding (Video assessment)    VICU Night Rounds Checklist  24H Vital Sign Range:  Temp:  [98.1 °F (36.7 °C)-99.3 °F (37.4 °C)]   Pulse:  []   Resp:  [17-41]   BP: ()/(52-73)   SpO2:  [87 %-98 %]     Video rounds and LDA reconciliation

## 2025-04-28 NOTE — SUBJECTIVE & OBJECTIVE
Past Medical History:   Diagnosis Date    Gestational diabetes        Past Surgical History:   Procedure Laterality Date    BUNIONECTOMY Right 2011       Review of patient's allergies indicates:   Allergen Reactions    Penicillins Other (See Comments)       No current facility-administered medications on file prior to encounter.     Current Outpatient Medications on File Prior to Encounter   Medication Sig    albuterol (PROVENTIL/VENTOLIN HFA) 90 mcg/actuation inhaler Inhale 1-2 puffs into the lungs every 6 (six) hours as needed for Wheezing or Shortness of Breath. Rescue    loratadine (CLARITIN) 10 mg tablet Take 10 mg by mouth once daily.    multivitamin (ONE DAILY MULTIVITAMIN) per tablet Take 1 tablet by mouth once daily. Prenatal vit     Family History       Problem Relation (Age of Onset)    Ovarian cancer Paternal Aunt          Tobacco Use    Smoking status: Never    Smokeless tobacco: Never   Substance and Sexual Activity    Alcohol use: Not Currently    Drug use: Never    Sexual activity: Not Currently     Partners: Male     Birth control/protection: None     Review of Systems   Constitutional:  Positive for activity change. Negative for appetite change and fever.   HENT:  Negative for congestion, ear pain, rhinorrhea and sinus pressure.    Eyes:  Negative for pain and discharge.   Respiratory:  Positive for chest tightness, shortness of breath and wheezing. Negative for cough.    Cardiovascular:  Negative for chest pain and leg swelling.   Gastrointestinal:  Negative for abdominal distention, abdominal pain, diarrhea, nausea and vomiting.   Endocrine: Negative for cold intolerance and heat intolerance.   Genitourinary:  Negative for difficulty urinating, flank pain, frequency, hematuria and urgency.   Musculoskeletal:  Negative for arthralgias, joint swelling and myalgias.   Allergic/Immunologic: Negative for environmental allergies and food allergies.   Neurological:  Negative for dizziness, weakness,  light-headedness and headaches.   Hematological:  Does not bruise/bleed easily.   Psychiatric/Behavioral:  Negative for agitation, behavioral problems and decreased concentration.      Objective:     Vital Signs (Most Recent):  Temp:  (UTO) (04/27/25 1827)  Pulse: (!) 127 (04/28/25 0000)  Resp: (S)  (on BiPAP) (04/28/25 0024)  BP: (!) 126/56 (04/28/25 0000)  SpO2: 98 % (04/28/25 0000) Vital Signs (24h Range):  Pulse:  [116-147] 127  Resp:  [22-30] 25  SpO2:  [87 %-98 %] 98 %  BP: (108-135)/(53-86) 126/56        There is no height or weight on file to calculate BMI.     Physical Exam  Constitutional:       Appearance: She is well-developed. She is ill-appearing.   HENT:      Head: Normocephalic.   Eyes:      General:         Right eye: No discharge.         Left eye: No discharge.      Conjunctiva/sclera: Conjunctivae normal.   Cardiovascular:      Rate and Rhythm: Regular rhythm. Tachycardia present.      Pulses:           Radial pulses are 2+ on the right side and 2+ on the left side.      Heart sounds: Normal heart sounds.   Pulmonary:      Effort: Tachypnea and respiratory distress present.      Breath sounds: Examination of the right-upper field reveals wheezing. Examination of the left-upper field reveals wheezing. Examination of the right-middle field reveals wheezing. Examination of the left-middle field reveals wheezing. Wheezing present.   Abdominal:      General: Bowel sounds are normal. There is no distension.      Palpations: Abdomen is soft.      Tenderness: There is no abdominal tenderness.   Musculoskeletal:         General: Normal range of motion.      Cervical back: Normal range of motion and neck supple.   Skin:     General: Skin is warm and dry.   Neurological:      Mental Status: She is alert and oriented to person, place, and time.      GCS: GCS eye subscore is 4. GCS verbal subscore is 5. GCS motor subscore is 6.      Motor: Motor function is intact.   Psychiatric:         Mood and Affect: Mood  normal.         Speech: Speech normal.         Behavior: Behavior normal.         Thought Content: Thought content normal.                Significant Labs: All pertinent labs within the past 24 hours have been reviewed.  CBC:   Recent Labs   Lab 04/27/25  2141 04/27/25  2223   WBC  --  13.96*   HGB  --  12.8   HCT 39 36.9*   PLT  --  187     CMP:   Recent Labs   Lab 04/27/25  2223      K 2.6*      CO2 14*   BUN 7   CREATININE 0.8   CALCIUM 8.6*   ALBUMIN 4.1   BILITOT 0.6   ALKPHOS 56   AST 16   ALT 13   ANIONGAP 12       Significant Imaging: I have reviewed all pertinent imaging results/findings within the past 24 hours.

## 2025-04-28 NOTE — CONSULTS
[facility] - Intensive Care ([place])  Pulmonology  Consult Note    Patient Name: Gifty Hernandez  MRN: [id number]  Admission Date: 4/27/2025  Hospital Length of Stay: 1 days  Code Status: Full Code  Attending Physician: Fidencio Ascencio MD  Primary Care Provider: Winifred, Primary Doctor   Principal Problem: Acute hypoxemic respiratory failure    Inpatient consult to Pulmonary Critical Care  Consult performed by: Tej Velasquez MD  Consult ordered by: Freeman Ivy NP        Subjective:     HPI:  39 year old female with history of asthma that presents to the ED for evaluation of 2 days shortness of breath, chest tightness and wheezing. Patient discloses no cough, nasal congestion, fever or chills. Patient does not smoke, no alcohol or illicit drug use. On presentation patient has low oxygen sat in significant respiratory distress. Labs remarkable for leukocytosis, hypokalemia, elevated lactic acid and blood glucose. Of note, patient has been taking inhale epinephrine at home. CTA chest showed no PE but there is mild patchy ground glass infiltrates of the left upper lobe, pulmonary nodules (13.6 mm) and thyroid nodules. Patient was placed on BiPAP, started on empiric antibiotics, bronchodilators and admitted to ICU. Pulmonary consulted for management.    Past Medical History:   Diagnosis Date    Gestational diabetes        Past Surgical History:   Procedure Laterality Date    BUNIONECTOMY Right 2011       Review of patient's allergies indicates:   Allergen Reactions    Penicillins Other (See Comments)       Family History       Problem Relation (Age of Onset)    Ovarian cancer Paternal Aunt          Tobacco Use    Smoking status: Never    Smokeless tobacco: Never   Substance and Sexual Activity    Alcohol use: Not Currently    Drug use: Never    Sexual activity: Not Currently     Partners: Male     Birth control/protection: None         Review of Systems   Constitutional:  Negative for chills and fever.    Respiratory:  Positive for chest tightness, shortness of breath and wheezing. Negative for cough.    Cardiovascular:  Negative for leg swelling.   Psychiatric/Behavioral:  Negative for agitation and confusion.      Objective:     Vital Signs (Most Recent):  Temp: 98.1 °F (36.7 °C) (04/28/25 1107)  Pulse: 93 (04/28/25 1135)  Resp: (!) 22 (04/28/25 1135)  BP: (!) 95/53 (04/28/25 1105)  SpO2: (!) 91 % (04/28/25 1107) Vital Signs (24h Range):  Temp:  [98.1 °F (36.7 °C)-99.3 °F (37.4 °C)] 98.1 °F (36.7 °C)  Pulse:  [] 93  Resp:  [18-41] 22  SpO2:  [87 %-98 %] 91 %  BP: ()/(53-86) 95/53     Weight: 87.7 kg (193 lb 5.5 oz)  Body mass index is 33.19 kg/m².      Intake/Output Summary (Last 24 hours) at 4/28/2025 1152  Last data filed at 4/28/2025 1050  Gross per 24 hour   Intake 3353.77 ml   Output 2150 ml   Net 1203.77 ml        Physical Exam  Vitals reviewed.   Constitutional:       General: She is not in acute distress.     Appearance: Normal appearance. She is not ill-appearing.   HENT:      Head: Normocephalic and atraumatic.      Mouth/Throat:      Mouth: Mucous membranes are moist.   Eyes:      Conjunctiva/sclera: Conjunctivae normal.   Cardiovascular:      Rate and Rhythm: Normal rate and regular rhythm.      Pulses: Normal pulses.      Heart sounds: No murmur heard.  Pulmonary:      Effort: Pulmonary effort is normal. No respiratory distress.      Breath sounds: Wheezing present.   Abdominal:      General: There is no distension.   Musculoskeletal:      Right lower leg: No edema.      Left lower leg: No edema.   Neurological:      General: No focal deficit present.      Mental Status: She is alert and oriented to person, place, and time.   Psychiatric:         Mood and Affect: Mood normal.         Behavior: Behavior normal.          Vents:  Oxygen Concentration (%): 65 (04/27/25 2321)    Lines/Drains/Airways       Peripheral Intravenous Line  Duration                  Peripheral IV - Single Lumen  04/27/25 2306 22 G Right Hand <1 day         Peripheral IV - Single Lumen 04/27/25 2316 22 G Left;Anterior Forearm <1 day         Peripheral IV - Single Lumen 04/28/25 0316 20 G No Right Antecubital <1 day                    Significant Labs:    CBC/Anemia Profile:  Recent Labs   Lab 04/27/25  2223 04/28/25  0403 04/28/25  0633   WBC 13.96* 11.86  --    HGB 12.8 12.3  --    HCT 36.9* 36.0* 35*    177  --    MCV 92 95  --    RDW 12.1 12.4  --         Chemistries:  Recent Labs   Lab 04/27/25 2223 04/28/25  0001 04/28/25  0403     --  134*   K 2.6*  --  3.8     --  110   CO2 14*  --  12*   BUN 7  --  7   CREATININE 0.8  --  0.7   CALCIUM 8.6*  --  8.1*   ALBUMIN 4.1  --  3.9   BILITOT 0.6  --  0.4   ALKPHOS 56  --  53   ALT 13  --  14   AST 16  --  11   GLUCOSE 300*  --  183*   MG 1.8 1.9 2.0   PHOS  --   --  1.6*       All pertinent labs within the past 24 hours have been reviewed.    Significant Imaging:   I have reviewed all pertinent imaging results/findings within the past 24 hours.    FINDINGS:  There is artifact including motion artifact present.  This degrades image quality and diminishes the sensitivity of the exam.  There is associated heterogeneity of the pulmonary arterial vasculature, significantly diminishing sensitivity of the examination for detection of or exclusion of pulmonary emboli most notably with respect to the midsize and smaller vessels and small distal pulmonary emboli cannot be excluded on the basis of this exam, otherwise when accounting for the aforementioned the overall pattern is consistent with artifact and an abnormal pattern of pulmonary arterial filling defects specific for pulmonary emboli is not seen.  There is no large central saddle type pulmonary embolus.     There are thyroid nodules noted, the most prominent is seen on the left measuring up to 14.5 mm.     Mild accentuated attenuation of the anterior mediastinum may relate to residual or reactivated thymic  tissue, this is not masslike in configuration and there is no additional evidence for mediastinal mass or enlarged adenopathy or hematoma.  There is no pericardial effusion or hemopericardium.  The thoracic aorta demonstrates appropriate opacification.     The lungs demonstrate motion artifact.  This limits the examination somewhat.  There are patchy areas of ground-glass opacity involving the left upper lobe.  There is a pleural based area of soft tissue nodularity seen involving the right upper lobe posterior medially, as seen on axial image 120 measuring approximately 8.3 x 13.6 mm.  There is a small pleural based pulmonary nodule noted on axial image 158 measuring approximately 4.6 mm.     There is no evidence for pleural effusion and there is no evidence for pneumothorax.     Limited imaging of the upper abdomen demonstrates no evidence for acute upper abdominal process.  The visualized osseous structures appear intact.  Chronic change noted.     Impression:     There is no large central saddle type pulmonary embolus, artifact including motion artifact limits the examination, particularly with respect to the midsize and smaller pulmonary arteries and small distal pulmonary emboli cannot be excluded, when accounting for limitations of the exam the overall appearance is consistent with artifact and an abnormal pattern of pulmonary arterial filling defects specific for pulmonary emboli is not seen.     Mild patchy ground-glass pulmonary infiltrates of the left upper lobe.     Pulmonary nodules are noted measuring up to approximately 13.6 mm.  For multiple solid nodules with any 6 mm or greater, Fleischner Society guidelines recommend follow up with non-contrast chest CT at 3-6 months and 18-24 months after discovery.     Thyroid nodules are noted measuring up to approximately 14.5 mm, ultrasound follow-up is recommended.     This report was flagged in Epic as abnormal.        Electronically signed by:Stefano  Kip  Date:                                            04/28/2025  Time:                                           04:20        Exam Ended: 04/28/25 03:25 CDT Last Resulted: 04/28/25 04:20 CDT     Assessment/Plan:   Acute Hypoxemia Respiratory Failure  Due to asthma exacerbation  Trial off BiPAP  Continue oxygen therapy and wean as tolerated    Severe Asthma Exacerbation  Continue Prednisone and scheduled Duonebs  Start ICS-LABA  Will need outpatient pulmonary follow up    Hypophosphatemia  Replete phosphorus    Hypokalemia  Replete potassium    Pulmonary nodule  Outpatient repeat CT scan in 3 to 6 months    Thyroid nodule  Check TSH  Outpatient thyroid US    .Feeding/fluids: N/A  Analgesia: N/A  Sedation: N/A  Thromboprophylaxis: Enoxaparin  Head up position: Yes  Ulcer prophylaxis: N/A  Glycemic control: Yes  Spontaneous breathing trial: N/A  Bowel care: Yes  Indwelling catheter removal: Peripheral lines  Deescalation of antibiotics: No pneumonia on CT scan, procalcitonin is negative. Consider discontinuation of antibiotics.      Patient was seen with the attending physician MD Tej Riley MD  Pulmonology  [facility] - Intensive Care ([place])

## 2025-04-28 NOTE — PROGRESS NOTES
Pharmacokinetic Initial Assessment: IV Vancomycin    Assessment/Plan:    Initiate intravenous vancomycin with loading dose of 1750 mg once followed by a maintenance dose of vancomycin 1250 mg IV every 12 hours  Desired empiric serum trough concentration is 15 to 20 mcg/mL  Draw vancomycin trough level 60 min prior to fourth dose on 04/29/25 at approximately 1730  Pharmacy will continue to follow and monitor vancomycin.      Please contact pharmacy at extension [phone] with any questions regarding this assessment.     Thank you for the consult,   Divina Foster       Patient brief summary:  Gifty Hernandez is a 39 y.o. female initiated on antimicrobial therapy with IV Vancomycin for treatment of suspected bacteremia    Drug Allergies:   Review of patient's allergies indicates:   Allergen Reactions    Penicillins Other (See Comments)       Actual Body Weight:   87.7 kg    Renal Function:   Estimated Creatinine Clearance: 115.7 mL/min (based on SCr of 0.7 mg/dL).,     Dialysis Method (if applicable):  N/A    CBC (last 72 hours):  Recent Labs   Lab Result Units 04/27/25 2223 04/28/25  0403   WBC K/uL 13.96* 11.86   HGB gm/dL 12.8 12.3   HCT % 36.9* 36.0*   Platelet Count K/uL 187 177   Lymph % % 4.4* 3.2*   Mono % % 0.6* 1.3*   Eos % % 0.1 0.0   Basophil % % 0.2 0.1       Metabolic Panel (last 72 hours):  Recent Labs   Lab Result Units 04/27/25 2223 04/28/25  0001 04/28/25  0257 04/28/25  0403   Sodium mmol/L 136  --   --  134*   Potassium mmol/L 2.6*  --   --  3.8   Chloride mmol/L 110  --   --  110   CO2 mmol/L 14*  --   --  12*   Glucose mg/dL 300*  --   --  183*   Glucose, UA   --   --  3+*  --    BUN mg/dL 7  --   --  7   Creatinine mg/dL 0.8  --   --  0.7   Albumin g/dL 4.1  --   --  3.9   Bilirubin Total mg/dL 0.6  --   --  0.4   ALP unit/L 56  --   --  53   AST unit/L 16  --   --  11   ALT unit/L 13  --   --  14   Magnesium  mg/dL 1.8 1.9  --  2.0   Phosphorus Level mg/dL  --   --   --  1.6*       Drug levels (last  "3 results):  No results for input(s): "VANCOMYCINRA", "VANCORANDOM", "VANCOMYCINPE", "VANCOPEAK", "VANCOMYCINTR", "VANCOTROUGH" in the last 72 hours.    Microbiologic Results:  Microbiology Results (last 7 days)       Procedure Component Value Units Date/Time    Blood Culture #1 **CANNOT BE ORDERED STAT** [[id number]] Collected: 04/27/25 2307    Order Status: Sent Specimen: Blood from Peripheral, Antecubital, Right Updated: 04/27/25 2313    Blood Culture #2 **CANNOT BE ORDERED STAT** [[id number]] Collected: 04/27/25 2307    Order Status: Sent Specimen: Blood from Peripheral, Hand, Right Updated: 04/27/25 2313            "

## 2025-04-28 NOTE — PROGRESS NOTES
[facility] Intensive Care [facility] Medicine  Progress Note    Patient Name: Gifty Hernandez  MRN: [id number]  Patient Class: IP- Inpatient   Admission Date: 4/27/2025  Length of Stay: 1 days  Attending Physician: Fidencio Ascencio MD  Primary Care Provider: Winifred, Primary Doctor          Principal Problem:Acute hypoxemic respiratory failure        HPI:  The patient is a 39-year-old female with a past medical history of asthma who presents with shortness breath. Patient's shortness breath started at midnight last night, and has been progressively worsening. She has some associated chest tightness. She also has a sore throat but does not actually feel sick. Her 2 year old son had a runny nose 2 days ago. She also seasonal allergies - takes loratadine daily. While in the ED, her shortness of breath continued to worsen requiring initiation of Bipap.  She will be admitted for acute respiratory failure with hypoxia.    Overview/Hospital Course:  On BIPAP overnight. Off BIPAP in AM. Still with some SOB. Anxiety may be playing component as well. Cont steroids, abx and duonebs. RIP and sputum culture pending. Lactate elevated at admission. After speaking with mother she said pt was using primatine mist (aerosolized epinephrine) after her albuterol ran out. Duonebs, primatine mist use, WOB and hypoxia likely contributing to her lactate elevation and hyperglycemia. Lactate elevation resolved this AM.  Cont to monitor.     Interval History: On BIPAP overnight. Off BIPAP in AM. Still with some SOB. Anxiety may be playing component as well. Mother updated via phone.     Review of Systems   Constitutional:  Positive for activity change. Negative for appetite change and fever.   HENT:  Negative for congestion, ear pain, rhinorrhea and sinus pressure.    Respiratory:  Positive for chest tightness, shortness of breath and wheezing. Negative for cough.    Cardiovascular:  Negative for chest pain and leg swelling.    Gastrointestinal:  Negative for abdominal distention, abdominal pain, diarrhea, nausea and vomiting.   Musculoskeletal:  Negative for arthralgias, joint swelling and myalgias.   Neurological:  Negative for dizziness, weakness, light-headedness and headaches.   Psychiatric/Behavioral:  Negative for agitation.      Objective:     Vital Signs (Most Recent):  Temp: 98.1 °F (36.7 °C) (04/28/25 1107)  Pulse: 92 (04/28/25 1237)  Resp: 20 (04/28/25 1237)  BP: (!) 113/55 (04/28/25 1200)  SpO2: (!) 94 % (04/28/25 1200) Vital Signs (24h Range):  Temp:  [98.1 °F (36.7 °C)-99.3 °F (37.4 °C)] 98.1 °F (36.7 °C)  Pulse:  [] 92  Resp:  [18-41] 20  SpO2:  [87 %-98 %] 94 %  BP: ()/(53-86) 113/55     Weight: 87.7 kg (193 lb 5.5 oz)  Body mass index is 33.19 kg/m².    Intake/Output Summary (Last 24 hours) at 4/28/2025 1251  Last data filed at 4/28/2025 1235  Gross per 24 hour   Intake 3684.49 ml   Output 2550 ml   Net 1134.49 ml         Physical Exam  Constitutional:       General: She is not in acute distress.     Comments: Anxious   Pulmonary:      Breath sounds: No wheezing.      Comments: SOB   Abdominal:      General: There is no distension.      Tenderness: There is no abdominal tenderness.   Neurological:      General: No focal deficit present.      Mental Status: She is oriented to person, place, and time.               Significant Labs: All pertinent labs within the past 24 hours have been reviewed.    Significant Imaging: I have reviewed all pertinent imaging results/findings within the past 24 hours.      Assessment & Plan  Acute hypoxemic respiratory failure  Asthma  The patient is a 39-year-old female with a past medical history of asthma who presents with shortness breath. Patient's shortness breath started at midnight last night, and has been progressively worsening. She has some associated chest tightness. She also has a sore throat but does not actually feel sick. Her 2 year old son had a runny nose 2 days  ago. She also seasonal allergies - takes loratadine daily. While in the ED, her shortness of breath continued to worsen requiring initiation of Bipap.      - 4/28 - On BIPAP overnight. Off BIPAP in AM. Still with some SOB. Anxiety may be playing component as well. Cont steroids, abx and duonebs. RIP and sputum culture pending.       Lactic acidosis  Lactate elevated at admission. After speaking with mother she said pt was using primatine mist (aerosolized epinephrine) after her albuterol ran out. Duonebs, primatine mist use, WOB and hypoxia likely contributing to her lactate elevation and hyperglycemia. Lactate elevation resolved this AM.  Cont to monitor.     Hypokalemia  Patient's most recent potassium results are listed below.   Recent Labs     04/27/25  2223 04/28/25  0403   K 2.6* 3.8     Plan  - Replete K PRn  - Monitor potassium Daily  - Patient's hypokalemia is  unchanged    VTE Risk Mitigation (From admission, onward)           Ordered     enoxaparin injection 40 mg  Every 24 hours         04/28/25 1156     IP VTE LOW RISK PATIENT  Once         04/27/25 2155     Place sequential compression device  Until discontinued         04/27/25 2155                    Discharge Planning   MINOR:      Code Status: Full Code   Medical Readiness for Discharge Date:   Discharge Plan A: (P) Home with family                        Fidencio Klein MD  Department of Hospital Medicine   [facility] Intensive [facility] ([facility]

## 2025-04-28 NOTE — EICU
Intervention Initiated From:  COR / EICU    Rose Marie intervened regarding:  Rounding (Video assessment)    Virtual ICU Admission    Admit Date: 2025  LOS: 1  Code Status: Full Code   : 1985  Bed: BICU 04/BICU 04A:     Diagnosis: Acute hypoxemic respiratory failure    Patient  has a past medical history of Gestational diabetes.    Last VS: BP (!) 126/56   Pulse (!) 126   Resp (!) 25   SpO2 (!) 94%   Breastfeeding No       VICU Review    VICU nurse assessment :  [place] completed, LDA documentation reconciliation completed, and VTE prophylaxis review        Nursing orders placed : IP AUDIE Peripheral IV Access

## 2025-04-28 NOTE — HPI
HPI by Freeman Ivy NP:  The patient is a 39-year-old female with a past medical history of asthma who presents with shortness of breath. Patient's shortness of breath started at midnight last night, and has been progressively worsening. She has some associated chest tightness. She also has a sore throat but does not actually feel sick. Her 2 year old son had a runny nose 2 days ago. She also seasonal allergies - takes loratadine daily. While in the ED, her shortness of breath continued to worsen requiring initiation of Bipap.  She will be admitted for acute respiratory failure with hypoxia.

## 2025-04-28 NOTE — ED TRIAGE NOTES
Pt arrived to the ED with her boyfriend and child via their personal vehicle with complaints of shortness of breath.  Pt has a hx of asthma and stated she began to experience an asthma exacerbation on last night which grew increasingly worse through today. Pt visibly sitting up in tripod position and speaking in brief sentences. Pt endorses chest tightness, rating it about 9/10 with radiation to the back and shortness of breath. Pt denies any additional complaints at this time.

## 2025-04-28 NOTE — ED NOTES
Pt notably more relaxed currently sitting in bed while receiving breathing treatment and IV fluids. Work of breathing appears to be decreasing. Vitals are stable.

## 2025-04-28 NOTE — ASSESSMENT & PLAN NOTE
Lactate elevated at admission. After speaking with mother she said pt was using primatine mist (aerosolized epinephrine) after her albuterol ran out. Duonebs, primatine mist use, WOB and hypoxia likely contributing to her lactate elevation and hyperglycemia. Lactate elevation resolved this AM.  Cont to monitor.

## 2025-04-28 NOTE — PLAN OF CARE
Case Management Assessment     PCP: Will schedule on discharge  Pharmacy: at bedside    Patient Arrived From: home  Existing Help at Home: significant other    Barriers to Discharge: none    Discharge Plan:    A. Home with family   B. home      Patient is AAOx3 and independent at baseline. Denies any DME. PCP will be scheduled upon discharge. Patient's significant other will take the patient home.      04/28/25 1207   Discharge Assessment   Assessment Type Discharge Planning Assessment   Confirmed/corrected address, phone number and insurance Yes   Confirmed Demographics Correct on Facesheet   Source of Information patient   Communicated MINOR with patient/caregiver Date not available/Unable to determine   Reason For Admission Acute hypoxemic respiratory failure   People in Home significant other   Facility Arrived From: home   Do you expect to return to your current living situation? Yes   Do you have help at home or someone to help you manage your care at home? Yes   Who are your caregiver(s) and their phone number(s)? Arvind Hilton [phone]   Prior to hospitilization cognitive status: Alert/Oriented   Current cognitive status: Alert/Oriented   Walking or Climbing Stairs Difficulty no   Dressing/Bathing Difficulty no   Equipment Currently Used at Home none   Readmission within 30 days? No   Do you currently have service(s) that help you manage your care at home? No   Do you take prescription medications? Yes   Do you have prescription coverage? Yes   Coverage Payor: MEDICAID - [employer] ([facility]) -   Do you have any problems affording any of your prescribed medications? No   Is the patient taking medications as prescribed? yes   Who is going to help you get home at discharge? significant other   How do you get to doctors appointments? car, drives self;family or friend will provide   Are you on dialysis? No   Do you take coumadin? No   Discharge Plan A Home with family   Discharge Plan B Home   DME Needed  Upon Discharge  none   Discharge Plan discussed with: Patient   Transition of Care Barriers None     [facility] - Intensive Care ([place])  Initial Discharge Assessment       Primary Care Provider: No, Primary Doctor    Admission Diagnosis: SOB (shortness of breath) [R06.02]    Admission Date: 4/27/2025  Expected Discharge Date:     Transition of Care Barriers: (P) None    Payor: MEDICAID / Plan: [employer] [place] [place] / Product Type: Managed Medicaid /     Extended Emergency Contact Information  Primary Emergency Contact: Woo Hilton  Mobile Phone: [phone]  Relation: Significant other  Preferred language: English   needed? No    Discharge Plan A: (P) Home with family  Discharge Plan B: (P) Home      [id number] - [facility] [address] [facility]  [address] [facility] [address]  Phone: [phone] Fax: [phone]    [facility]  [address]  [facility] [address]  Phone: [phone] Fax: [phone]      Initial Assessment (most recent)       Adult Discharge Assessment - 04/28/25 1207          Discharge Assessment    Assessment Type Discharge Planning Assessment (P)      Confirmed/corrected address, phone number and insurance Yes (P)      Confirmed Demographics Correct on Facesheet (P)      Source of Information patient (P)      Communicated MINOR with patient/caregiver Date not available/Unable to determine (P)      Reason For Admission Acute hypoxemic respiratory failure (P)      People in Home significant other (P)      Facility Arrived From: home (P)      Do you expect to return to your current living situation? Yes (P)      Do you have help at home or someone to help you manage your care at home? Yes (P)      Who are your caregiver(s) and their phone number(s)? Arvind Hilton [phone] (P)      Prior to hospitilization cognitive status: Alert/Oriented (P)      Current cognitive status: Alert/Oriented (P)       Walking or Climbing Stairs Difficulty no (P)      Dressing/Bathing Difficulty no (P)      Equipment Currently Used at Home none (P)      Readmission within 30 days? No (P)      Do you currently have service(s) that help you manage your care at home? No (P)      Do you take prescription medications? Yes (P)      Do you have prescription coverage? Yes (P)      Coverage Payor: MEDICAID - [employer] [place] [place] - (P)      Do you have any problems affording any of your prescribed medications? No (P)      Is the patient taking medications as prescribed? yes (P)      Who is going to help you get home at discharge? significant other (P)      How do you get to doctors appointments? car, drives self;family or friend will provide (P)      Are you on dialysis? No (P)      Do you take coumadin? No (P)      Discharge Plan A Home with family (P)      Discharge Plan B Home (P)      DME Needed Upon Discharge  none (P)      Discharge Plan discussed with: Patient (P)      Transition of Care Barriers None (P)

## 2025-04-28 NOTE — ASSESSMENT & PLAN NOTE
Patient with Hypoxic Respiratory failure which is Acute.  she is not on home oxygen. Supplemental oxygen was provided and noted- Oxygen Concentration (%):  [50-65] 65    .   Signs/symptoms of respiratory failure include- tachypnea, increased work of breathing, respiratory distress, use of accessory muscles, and wheezing. Contributing diagnoses includes - None Labs and images were reviewed. Patient Has recent ABG, which has been reviewed. Will treat underlying causes and adjust management of respiratory failure as follows- Duonebs, abx, prednisone, Pulm/CC consult

## 2025-04-28 NOTE — EICU
Intervention Initiated From:  COR / EICU    Rose Marie intervened regarding:  Rounding (Video assessment)    Comments: Virtual ICU Quality Rounds    Admit Date: 4/27/2025  Hospital Day: 1    ICU Day: 7h    24H Vital Sign Range:  Temp:  [98.7 °F (37.1 °C)-99.3 °F (37.4 °C)]   Pulse:  []   Resp:  [22-41]   BP: (101-156)/(53-86)   SpO2:  [87 %-98 %]     VICU Surveillance Screening    LDA reconciliation : Yes

## 2025-04-28 NOTE — PLAN OF CARE
Problem: Adult Inpatient Plan of Care  Goal: Plan of Care Review  4/28/2025 0635 by Shameka Patterson RN  Outcome: Progressing    Goal: Patient-Specific Goal (Individualized)  4/28/2025 0635 by Shameka Patterson RN  Outcome: Progressing  Goal: Absence of Hospital-Acquired Illness or Injury  4/28/2025 0635 by Shameka Patterson RN  Outcome: Progressing  Goal: Optimal Comfort and Wellbeing  4/28/2025 0635 by Shameka Patterson RN  Outcome: Progressing  Goal: Readiness for Transition of Care  4/28/2025 0635 by Shameka Patterson RN  Outcome: Progressing     Problem: Fatigue  Goal: Improved Activity Tolerance  Outcome: Progressing     Problem: Fall Injury Risk  Goal: Absence of Fall and Fall-Related Injury  Outcome: Progressing     Problem: Gas Exchange Impaired  Goal: Optimal Gas Exchange  Outcome: Progressing

## 2025-04-28 NOTE — ASSESSMENT & PLAN NOTE
The patient is a 39-year-old female with a past medical history of asthma who presents with shortness breath. Patient's shortness breath started at midnight last night, and has been progressively worsening. She has some associated chest tightness. She also has a sore throat but does not actually feel sick. Her 2 year old son had a runny nose 2 days ago. She also seasonal allergies - takes loratadine daily. While in the ED, her shortness of breath continued to worsen requiring initiation of Bipap.      - 4/28 - On BIPAP overnight. Off BIPAP in AM. Still with some SOB. Anxiety may be playing component as well. Cont steroids, abx and duonebs. RIP and sputum culture pending.

## 2025-04-28 NOTE — ED NOTES
Respiratory notified to assist with transport to ICU with BiPAP. En route in approximately 10 mins or less.

## 2025-04-28 NOTE — ASSESSMENT & PLAN NOTE
Patient's most recent potassium results are listed below.   Recent Labs     04/27/25  2223 04/28/25  0403   K 2.6* 3.8     Plan  - Replete K PRn  - Monitor potassium Daily  - Patient's hypokalemia is unchanged

## 2025-04-28 NOTE — HOSPITAL COURSE
Patient was admitted to ICU on BiPAP due to asthma exacerbation and viral infection.  She was weaned off BiPAP in the morning but remained short of breath.  She was continued on steroids, empiric antibiotics and respiratory treatments with Duonebs.  Respiratory infection panel was positive for rhinovirus.  Patient's mother disclosed she had run out of her albuterol and had been using Primatene mist (aerosolized epinephrine).    She had a lactic acidosis that resolved with treatment.  She continued to have wheezing on expiration but was stable for transfer to the floor without BiPAP by 4/29.  Oxygen saturation stabilized and she was feeling better on discharge.  She will complete the course of oral steroids at home, and she was prescribed a rescue inhaler with a spacer and Wixela as a preventive inhaler.  Referral to outpatient pulmonology was sent.  She was seen and examined on the day of discharge.

## 2025-04-28 NOTE — PROGRESS NOTES
38 yo female w/ PMHx Asthma here w/ infective exacerbation.     S/p IV steroids, epi and Mg, has improved.   Now in the ICU for observation.     S/p cx, pt is on ceftriaxone.   Nebs optimized and steroids for now.   IVF for hydration.   Sugar control added.     Lovenox for DVT prophy.   Please call us for further assistance.     Detailed H&P noted in the chart.

## 2025-04-28 NOTE — SUBJECTIVE & OBJECTIVE
Past Medical History:   Diagnosis Date    Gestational diabetes        Past Surgical History:   Procedure Laterality Date    BUNIONECTOMY Right 2011       Review of patient's allergies indicates:   Allergen Reactions    Penicillins Other (See Comments)       Family History       Problem Relation (Age of Onset)    Ovarian cancer Paternal Aunt          Tobacco Use    Smoking status: Never    Smokeless tobacco: Never   Substance and Sexual Activity    Alcohol use: Not Currently    Drug use: Never    Sexual activity: Not Currently     Partners: Male     Birth control/protection: None         Review of Systems   Constitutional:  Negative for chills and fever.   Respiratory:  Positive for chest tightness, shortness of breath and wheezing. Negative for cough.    Cardiovascular:  Negative for leg swelling.   Psychiatric/Behavioral:  Negative for agitation and confusion.      Objective:     Vital Signs (Most Recent):  Temp: 98.1 °F (36.7 °C) (04/28/25 1107)  Pulse: 93 (04/28/25 1135)  Resp: (!) 22 (04/28/25 1135)  BP: (!) 95/53 (04/28/25 1105)  SpO2: (!) 91 % (04/28/25 1107) Vital Signs (24h Range):  Temp:  [98.1 °F (36.7 °C)-99.3 °F (37.4 °C)] 98.1 °F (36.7 °C)  Pulse:  [] 93  Resp:  [18-41] 22  SpO2:  [87 %-98 %] 91 %  BP: ()/(53-86) 95/53     Weight: 87.7 kg (193 lb 5.5 oz)  Body mass index is 33.19 kg/m².      Intake/Output Summary (Last 24 hours) at 4/28/2025 1152  Last data filed at 4/28/2025 1050  Gross per 24 hour   Intake 3353.77 ml   Output 2150 ml   Net 1203.77 ml        Physical Exam  Vitals reviewed.   Constitutional:       General: She is not in acute distress.     Appearance: Normal appearance. She is not ill-appearing.   HENT:      Head: Normocephalic and atraumatic.      Mouth/Throat:      Mouth: Mucous membranes are moist.   Eyes:      Conjunctiva/sclera: Conjunctivae normal.   Cardiovascular:      Rate and Rhythm: Normal rate and regular rhythm.      Pulses: Normal pulses.      Heart sounds: No  murmur heard.  Pulmonary:      Effort: Pulmonary effort is normal. No respiratory distress.      Breath sounds: Wheezing present.   Abdominal:      General: There is no distension.   Musculoskeletal:      Right lower leg: No edema.      Left lower leg: No edema.   Neurological:      General: No focal deficit present.      Mental Status: She is alert and oriented to person, place, and time.   Psychiatric:         Mood and Affect: Mood normal.         Behavior: Behavior normal.          Vents:  Oxygen Concentration (%): 65 (04/27/25 2321)    Lines/Drains/Airways       Peripheral Intravenous Line  Duration                  Peripheral IV - Single Lumen 04/27/25 2306 22 G Right Hand <1 day         Peripheral IV - Single Lumen 04/27/25 2316 22 G Left;Anterior Forearm <1 day         Peripheral IV - Single Lumen 04/28/25 0316 20 G No Right Antecubital <1 day                    Significant Labs:    CBC/Anemia Profile:  Recent Labs   Lab 04/27/25  2223 04/28/25  0403 04/28/25  0633   WBC 13.96* 11.86  --    HGB 12.8 12.3  --    HCT 36.9* 36.0* 35*    177  --    MCV 92 95  --    RDW 12.1 12.4  --         Chemistries:  Recent Labs   Lab 04/27/25  2223 04/28/25  0001 04/28/25  0403     --  134*   K 2.6*  --  3.8     --  110   CO2 14*  --  12*   BUN 7  --  7   CREATININE 0.8  --  0.7   CALCIUM 8.6*  --  8.1*   ALBUMIN 4.1  --  3.9   BILITOT 0.6  --  0.4   ALKPHOS 56  --  53   ALT 13  --  14   AST 16  --  11   GLUCOSE 300*  --  183*   MG 1.8 1.9 2.0   PHOS  --   --  1.6*       All pertinent labs within the past 24 hours have been reviewed.    Significant Imaging:   I have reviewed all pertinent imaging results/findings within the past 24 hours.    FINDINGS:  There is artifact including motion artifact present.  This degrades image quality and diminishes the sensitivity of the exam.  There is associated heterogeneity of the pulmonary arterial vasculature, significantly diminishing sensitivity of the examination  for detection of or exclusion of pulmonary emboli most notably with respect to the midsize and smaller vessels and small distal pulmonary emboli cannot be excluded on the basis of this exam, otherwise when accounting for the aforementioned the overall pattern is consistent with artifact and an abnormal pattern of pulmonary arterial filling defects specific for pulmonary emboli is not seen.  There is no large central saddle type pulmonary embolus.     There are thyroid nodules noted, the most prominent is seen on the left measuring up to 14.5 mm.     Mild accentuated attenuation of the anterior mediastinum may relate to residual or reactivated thymic tissue, this is not masslike in configuration and there is no additional evidence for mediastinal mass or enlarged adenopathy or hematoma.  There is no pericardial effusion or hemopericardium.  The thoracic aorta demonstrates appropriate opacification.     The lungs demonstrate motion artifact.  This limits the examination somewhat.  There are patchy areas of ground-glass opacity involving the left upper lobe.  There is a pleural based area of soft tissue nodularity seen involving the right upper lobe posterior medially, as seen on axial image 120 measuring approximately 8.3 x 13.6 mm.  There is a small pleural based pulmonary nodule noted on axial image 158 measuring approximately 4.6 mm.     There is no evidence for pleural effusion and there is no evidence for pneumothorax.     Limited imaging of the upper abdomen demonstrates no evidence for acute upper abdominal process.  The visualized osseous structures appear intact.  Chronic change noted.     Impression:     There is no large central saddle type pulmonary embolus, artifact including motion artifact limits the examination, particularly with respect to the midsize and smaller pulmonary arteries and small distal pulmonary emboli cannot be excluded, when accounting for limitations of the exam the overall appearance  is consistent with artifact and an abnormal pattern of pulmonary arterial filling defects specific for pulmonary emboli is not seen.     Mild patchy ground-glass pulmonary infiltrates of the left upper lobe.     Pulmonary nodules are noted measuring up to approximately 13.6 mm.  For multiple solid nodules with any 6 mm or greater, Fleischner Society guidelines recommend follow up with non-contrast chest CT at 3-6 months and 18-24 months after discovery.     Thyroid nodules are noted measuring up to approximately 14.5 mm, ultrasound follow-up is recommended.     This report was flagged in Epic as abnormal.        Electronically signed by:Stefano Shin  Date:                                            04/28/2025  Time:                                           04:20        Exam Ended: 04/28/25 03:25 CDT Last Resulted: 04/28/25 04:20 CDT

## 2025-04-29 PROBLEM — B34.8 RHINOVIRUS INFECTION: Status: ACTIVE | Noted: 2025-04-29

## 2025-04-29 PROBLEM — J45.41 MODERATE PERSISTENT ASTHMA WITH EXACERBATION: Status: ACTIVE | Noted: 2021-07-15

## 2025-04-29 PROBLEM — E87.6 HYPOKALEMIA: Status: RESOLVED | Noted: 2025-04-28 | Resolved: 2025-04-29

## 2025-04-29 LAB
ABSOLUTE EOSINOPHIL (OHS): 0.07 K/UL
ABSOLUTE MONOCYTE (OHS): 1.3 K/UL (ref 0.3–1)
ABSOLUTE NEUTROPHIL COUNT (OHS): 18.79 K/UL (ref 1.8–7.7)
ANION GAP (OHS): 8 MMOL/L (ref 8–16)
BASOPHILS # BLD AUTO: 0.04 K/UL
BASOPHILS NFR BLD AUTO: 0.2 %
BUN SERPL-MCNC: 11 MG/DL (ref 6–20)
CALCIUM SERPL-MCNC: 8.5 MG/DL (ref 8.7–10.5)
CHLORIDE SERPL-SCNC: 111 MMOL/L (ref 95–110)
CO2 SERPL-SCNC: 19 MMOL/L (ref 23–29)
CREAT SERPL-MCNC: 0.6 MG/DL (ref 0.5–1.4)
ERYTHROCYTE [DISTWIDTH] IN BLOOD BY AUTOMATED COUNT: 12.9 % (ref 11.5–14.5)
GFR SERPLBLD CREATININE-BSD FMLA CKD-EPI: >60 ML/MIN/1.73/M2
GLUCOSE SERPL-MCNC: 121 MG/DL (ref 70–110)
HCT VFR BLD AUTO: 34.5 % (ref 37–48.5)
HGB BLD-MCNC: 11.5 GM/DL (ref 12–16)
IMM GRANULOCYTES # BLD AUTO: 0.2 K/UL (ref 0–0.04)
IMM GRANULOCYTES NFR BLD AUTO: 0.9 % (ref 0–0.5)
LYMPHOCYTES # BLD AUTO: 1.97 K/UL (ref 1–4.8)
MAGNESIUM SERPL-MCNC: 2.4 MG/DL (ref 1.6–2.6)
MCH RBC QN AUTO: 31.8 PG (ref 27–31)
MCHC RBC AUTO-ENTMCNC: 33.3 G/DL (ref 32–36)
MCV RBC AUTO: 95 FL (ref 82–98)
NUCLEATED RBC (/100WBC) (OHS): 0 /100 WBC
PHOSPHATE SERPL-MCNC: 2.5 MG/DL (ref 2.7–4.5)
PLATELET # BLD AUTO: 173 K/UL (ref 150–450)
PMV BLD AUTO: 10.6 FL (ref 9.2–12.9)
POCT GLUCOSE: 115 MG/DL (ref 70–110)
POCT GLUCOSE: 160 MG/DL (ref 70–110)
POTASSIUM SERPL-SCNC: 3.9 MMOL/L (ref 3.5–5.1)
RBC # BLD AUTO: 3.62 M/UL (ref 4–5.4)
RELATIVE EOSINOPHIL (OHS): 0.3 %
RELATIVE LYMPHOCYTE (OHS): 8.8 % (ref 18–48)
RELATIVE MONOCYTE (OHS): 5.8 % (ref 4–15)
RELATIVE NEUTROPHIL (OHS): 84 % (ref 38–73)
SODIUM SERPL-SCNC: 138 MMOL/L (ref 136–145)
VANCOMYCIN TROUGH SERPL-MCNC: <1.1 UG/ML (ref 10–22)
WBC # BLD AUTO: 22.37 K/UL (ref 3.9–12.7)

## 2025-04-29 PROCEDURE — 25000242 PHARM REV CODE 250 ALT 637 W/ HCPCS: Performed by: HOSPITALIST

## 2025-04-29 PROCEDURE — 80202 ASSAY OF VANCOMYCIN: CPT | Performed by: STUDENT IN AN ORGANIZED HEALTH CARE EDUCATION/TRAINING PROGRAM

## 2025-04-29 PROCEDURE — 83735 ASSAY OF MAGNESIUM: CPT | Performed by: NURSE PRACTITIONER

## 2025-04-29 PROCEDURE — 99900035 HC TECH TIME PER 15 MIN (STAT)

## 2025-04-29 PROCEDURE — 25000242 PHARM REV CODE 250 ALT 637 W/ HCPCS: Performed by: NURSE PRACTITIONER

## 2025-04-29 PROCEDURE — 27000207 HC ISOLATION

## 2025-04-29 PROCEDURE — 80048 BASIC METABOLIC PNL TOTAL CA: CPT | Performed by: STUDENT IN AN ORGANIZED HEALTH CARE EDUCATION/TRAINING PROGRAM

## 2025-04-29 PROCEDURE — 85025 COMPLETE CBC W/AUTO DIFF WBC: CPT | Performed by: NURSE PRACTITIONER

## 2025-04-29 PROCEDURE — 36415 COLL VENOUS BLD VENIPUNCTURE: CPT | Performed by: NURSE PRACTITIONER

## 2025-04-29 PROCEDURE — 11000001 HC ACUTE MED/SURG PRIVATE ROOM

## 2025-04-29 PROCEDURE — 94761 N-INVAS EAR/PLS OXIMETRY MLT: CPT

## 2025-04-29 PROCEDURE — 94640 AIRWAY INHALATION TREATMENT: CPT

## 2025-04-29 PROCEDURE — 63600175 PHARM REV CODE 636 W HCPCS: Performed by: NURSE PRACTITIONER

## 2025-04-29 PROCEDURE — 94799 UNLISTED PULMONARY SVC/PX: CPT

## 2025-04-29 PROCEDURE — 84100 ASSAY OF PHOSPHORUS: CPT | Performed by: NURSE PRACTITIONER

## 2025-04-29 PROCEDURE — 63600175 PHARM REV CODE 636 W HCPCS: Performed by: STUDENT IN AN ORGANIZED HEALTH CARE EDUCATION/TRAINING PROGRAM

## 2025-04-29 PROCEDURE — 25000003 PHARM REV CODE 250: Performed by: STUDENT IN AN ORGANIZED HEALTH CARE EDUCATION/TRAINING PROGRAM

## 2025-04-29 RX ORDER — IPRATROPIUM BROMIDE AND ALBUTEROL SULFATE 2.5; .5 MG/3ML; MG/3ML
3 SOLUTION RESPIRATORY (INHALATION) EVERY 4 HOURS
Status: DISCONTINUED | OUTPATIENT
Start: 2025-04-29 | End: 2025-04-30 | Stop reason: HOSPADM

## 2025-04-29 RX ADMIN — ENOXAPARIN SODIUM 40 MG: 40 INJECTION SUBCUTANEOUS at 05:04

## 2025-04-29 RX ADMIN — IPRATROPIUM BROMIDE AND ALBUTEROL SULFATE 3 ML: 2.5; .5 SOLUTION RESPIRATORY (INHALATION) at 07:04

## 2025-04-29 RX ADMIN — CEFTRIAXONE 1 G: 1 INJECTION, POWDER, FOR SOLUTION INTRAMUSCULAR; INTRAVENOUS at 01:04

## 2025-04-29 RX ADMIN — CETIRIZINE HYDROCHLORIDE 5 MG: 5 TABLET ORAL at 08:04

## 2025-04-29 RX ADMIN — IPRATROPIUM BROMIDE AND ALBUTEROL SULFATE 3 ML: 2.5; .5 SOLUTION RESPIRATORY (INHALATION) at 11:04

## 2025-04-29 RX ADMIN — MUPIROCIN: 20 OINTMENT TOPICAL at 08:04

## 2025-04-29 RX ADMIN — AZITHROMYCIN MONOHYDRATE 500 MG: 500 INJECTION, POWDER, LYOPHILIZED, FOR SOLUTION INTRAVENOUS at 07:04

## 2025-04-29 RX ADMIN — PREDNISONE 50 MG: 50 TABLET ORAL at 08:04

## 2025-04-29 RX ADMIN — FLUTICASONE FUROATE AND VILANTEROL TRIFENATATE 1 PUFF: 200; 25 POWDER RESPIRATORY (INHALATION) at 07:04

## 2025-04-29 RX ADMIN — IPRATROPIUM BROMIDE AND ALBUTEROL SULFATE 3 ML: 2.5; .5 SOLUTION RESPIRATORY (INHALATION) at 04:04

## 2025-04-29 NOTE — PLAN OF CARE
Problem: Adult Inpatient Plan of Care  Goal: Plan of Care Review  Outcome: Progressing  Flowsheets (Taken 4/29/2025 0534)  Plan of Care Reviewed With: patient     Problem: Fatigue  Goal: Improved Activity Tolerance  Outcome: Progressing  Intervention: Promote Improved Energy  Flowsheets (Taken 4/29/2025 0534)  Fatigue Management: frequent rest breaks encouraged  Sleep/Rest Enhancement:   awakenings minimized   noise level reduced  Activity Management:   Rolling - L1   Walking in place - L3  Environmental Support: calm environment promoted     Problem: Fall Injury Risk  Goal: Absence of Fall and Fall-Related Injury  Outcome: Progressing  Intervention: Identify and Manage Contributors  Flowsheets (Taken 4/29/2025 0534)  Self-Care Promotion:   independence encouraged   BADL personal objects within reach   BADL personal routines maintained  Medication Review/Management: medications reviewed

## 2025-04-29 NOTE — PROGRESS NOTES
[facility] Intensive Care [facility] Medicine  Progress Note    Patient Name: Gifty Hernandez  MRN: [id number]  Patient Class: IP- Inpatient   Admission Date: 4/27/2025  Length of Stay: 2 days  Attending Physician: Juana Khan MD  Primary Care Provider: Winifred, Primary Doctor        Subjective     Principal Problem:Acute hypoxemic respiratory failure        HPI:  HPI by Freeman Ivy NP:  The patient is a 39-year-old female with a past medical history of asthma who presents with shortness of breath. Patient's shortness of breath started at midnight last night, and has been progressively worsening. She has some associated chest tightness. She also has a sore throat but does not actually feel sick. Her 2 year old son had a runny nose 2 days ago. She also seasonal allergies - takes loratadine daily. While in the ED, her shortness of breath continued to worsen requiring initiation of Bipap.  She will be admitted for acute respiratory failure with hypoxia.    Overview/Hospital Course:  Patient was admitted to ICU on BiPAP due to asthma exacerbation and viral infection.  She was weaned off BiPAP in the morning but remained short of breath.  She was continued on steroids, empiric antibiotics and respiratory treatments with Duonebs.  Respiratory infection panel was positive for rhinovirus.  Patient's mother disclosed she had run out of her albuterol and had been using Primatene mist (aerosolized epinephrine).    She had a lactic acidosis that resolved with treatment.  She continued to have wheezing on expiration but was stable for transfer to the floor without BiPAP by 4/29.      Interval History:  Patient feeling better but still has significant wheezing.  Did not get respiratory treatments overnight and feels wheezing is somewhat worse as a result although she appreciated getting the extra sleep.    Review of Systems   Constitutional:  Negative for chills and fever.   Respiratory:  Positive for shortness of  breath and wheezing. Negative for cough.    Cardiovascular:  Negative for chest pain and palpitations.     Objective:     Vital Signs (Most Recent):  Temp: 98 °F (36.7 °C) (04/29/25 0705)  Pulse: 72 (04/29/25 0745)  Resp: (!) 21 (04/29/25 0745)  BP: (!) 98/53 (04/29/25 0705)  SpO2: 99 % (04/29/25 0705) Vital Signs (24h Range):  Temp:  [98 °F (36.7 °C)-98.9 °F (37.2 °C)] 98 °F (36.7 °C)  Pulse:  [] 72  Resp:  [16-38] 21  SpO2:  [90 %-99 %] 99 %  BP: ()/(50-59) 98/53     Weight: 87.7 kg (193 lb 5.5 oz)  Body mass index is 33.19 kg/m².    Intake/Output Summary (Last 24 hours) at 4/29/2025 0811  Last data filed at 4/29/2025 0638  Gross per 24 hour   Intake 1520.23 ml   Output 4050 ml   Net -2529.77 ml         Physical Exam  Cardiovascular:      Rate and Rhythm: Normal rate and regular rhythm.      Heart sounds: Normal heart sounds. No murmur heard.     No gallop.   Pulmonary:      Effort: Pulmonary effort is normal.      Breath sounds: Wheezing present.      Comments: Prolonged expiratory phase, increased work of breathing, wheezing louder on expiration.  Abdominal:      General: Bowel sounds are normal.      Palpations: Abdomen is soft.   Skin:     General: Skin is warm and dry.   Neurological:      General: No focal deficit present.      Mental Status: She is alert.   Psychiatric:         Mood and Affect: Mood normal.         Behavior: Behavior normal.               Significant Labs: All pertinent labs within the past 24 hours have been reviewed.    Significant Imaging: I have reviewed all pertinent imaging results/findings within the past 24 hours.      Assessment & Plan  Acute hypoxemic respiratory failure  Moderate persistent asthma with exacerbation  Rhinovirus infection  The patient is a 39-year-old female with a past medical history of asthma who presents with shortness breath. Patient's shortness breath started at midnight last night, and has been progressively worsening. She has some associated  chest tightness. She also has a sore throat but does not actually feel sick. Her 2 year old son had a runny nose 2 days ago. She also seasonal allergies - takes loratadine daily. While in the ED, her shortness of breath continued to worsen requiring initiation of Bipap.      - 4/28 - On BIPAP overnight. Off BIPAP in AM. Still with some SOB. Anxiety may be playing component as well. Cont steroids, abx and duonebs. RIP positive for rhinovirus infection.      OK to transfer to floor with no further need for BiPAP (4/29).      Lactic acidosis  Lactate elevated at admission. After speaking with mother she said pt was using primatine mist (aerosolized epinephrine) after her albuterol ran out. Duonebs, primatine mist use, WOB and hypoxia likely contributing to her lactate elevation and hyperglycemia. Lactate elevation resolved this AM.  Cont to monitor.     Hypokalemia (Resolved: 4/29/2025)  Patient's most recent potassium results are listed below.   Recent Labs     04/27/25  2223 04/28/25  0403 04/29/25  0345   K 2.6* 3.8 3.9     Plan  - Replete K PRn  - Monitor potassium Daily  - Patient's hypokalemia is  unchanged    VTE Risk Mitigation (From admission, onward)           Ordered     enoxaparin injection 40 mg  Every 24 hours         04/28/25 1156     IP VTE LOW RISK PATIENT  Once         04/27/25 2155     Place sequential compression device  Until discontinued         04/27/25 2155                    Discharge Planning   MINOR: 4/30/2025     Code Status: Full Code   Medical Readiness for Discharge Date:   Discharge Plan A: Home with family                        Juana Moulton MD  Department of Hospital Medicine   [facility] ([place])

## 2025-04-29 NOTE — EICU
Intervention Initiated From:  [facility] / EICU    Rose Marie intervened regarding:  Rounding (Video assessment)    Virtual ICU Quality Rounds    Admit Date: 4/27/2025  Hospital Day: 2    ICU Day: 1d 6h    24H Vital Sign Range:  Temp:  [98.1 °F (36.7 °C)-98.9 °F (37.2 °C)]   Pulse:  []   Resp:  [16-38]   BP: ()/(50-59)   SpO2:  [90 %-98 %]     VICU Surveillance Screening    LDA reconciliation : Yes

## 2025-04-29 NOTE — SUBJECTIVE & OBJECTIVE
Interval History:  Patient feeling better but still has significant wheezing.  Did not get respiratory treatments overnight and feels wheezing is somewhat worse as a result although she appreciated getting the extra sleep.    Review of Systems   Constitutional:  Negative for chills and fever.   Respiratory:  Positive for shortness of breath and wheezing. Negative for cough.    Cardiovascular:  Negative for chest pain and palpitations.     Objective:     Vital Signs (Most Recent):  Temp: 98 °F (36.7 °C) (04/29/25 0705)  Pulse: 72 (04/29/25 0745)  Resp: (!) 21 (04/29/25 0745)  BP: (!) 98/53 (04/29/25 0705)  SpO2: 99 % (04/29/25 0705) Vital Signs (24h Range):  Temp:  [98 °F (36.7 °C)-98.9 °F (37.2 °C)] 98 °F (36.7 °C)  Pulse:  [] 72  Resp:  [16-38] 21  SpO2:  [90 %-99 %] 99 %  BP: ()/(50-59) 98/53     Weight: 87.7 kg (193 lb 5.5 oz)  Body mass index is 33.19 kg/m².    Intake/Output Summary (Last 24 hours) at 4/29/2025 0811  Last data filed at 4/29/2025 0638  Gross per 24 hour   Intake 1520.23 ml   Output 4050 ml   Net -2529.77 ml         Physical Exam  Cardiovascular:      Rate and Rhythm: Normal rate and regular rhythm.      Heart sounds: Normal heart sounds. No murmur heard.     No gallop.   Pulmonary:      Effort: Pulmonary effort is normal.      Breath sounds: Wheezing present.      Comments: Prolonged expiratory phase, increased work of breathing, wheezing louder on expiration.  Abdominal:      General: Bowel sounds are normal.      Palpations: Abdomen is soft.   Skin:     General: Skin is warm and dry.   Neurological:      General: No focal deficit present.      Mental Status: She is alert.   Psychiatric:         Mood and Affect: Mood normal.         Behavior: Behavior normal.               Significant Labs: All pertinent labs within the past 24 hours have been reviewed.    Significant Imaging: I have reviewed all pertinent imaging results/findings within the past 24 hours.

## 2025-04-29 NOTE — SUBJECTIVE & OBJECTIVE
Interval History:   No acute overnight event.  Patient discloses improving shortness of breath but still wheezing.       Objective:     Vital Signs (Most Recent):  Temp: 98 °F (36.7 °C) (04/29/25 0705)  Pulse: 62 (04/29/25 0800)  Resp: (!) 22 (04/29/25 0800)  BP: 91/67 (04/29/25 0800)  SpO2: 96 % (04/29/25 0800) Vital Signs (24h Range):  Temp:  [98 °F (36.7 °C)-98.9 °F (37.2 °C)] 98 °F (36.7 °C)  Pulse:  [] 62  Resp:  [16-38] 22  SpO2:  [90 %-99 %] 96 %  BP: ()/(50-67) 91/67     Weight: 87.7 kg (193 lb 5.5 oz)  Body mass index is 33.19 kg/m².      Intake/Output Summary (Last 24 hours) at 4/29/2025 0905  Last data filed at 4/29/2025 0638  Gross per 24 hour   Intake 1520.23 ml   Output 3150 ml   Net -1629.77 ml        Physical Exam  Vitals reviewed.   Constitutional:       General: She is not in acute distress.     Appearance: Normal appearance. She is not ill-appearing.   HENT:      Head: Normocephalic and atraumatic.      Mouth/Throat:      Mouth: Mucous membranes are moist.   Eyes:      Conjunctiva/sclera: Conjunctivae normal.   Cardiovascular:      Rate and Rhythm: Normal rate and regular rhythm.      Pulses: Normal pulses.      Heart sounds: No murmur heard.  Pulmonary:      Effort: Pulmonary effort is normal. No respiratory distress.      Breath sounds: Wheezing present.   Musculoskeletal:      Right lower leg: No edema.      Left lower leg: No edema.   Neurological:      General: No focal deficit present.      Mental Status: She is alert and oriented to person, place, and time.   Psychiatric:         Mood and Affect: Mood normal.         Behavior: Behavior normal.           Review of Systems    Vents:  Oxygen Concentration (%): 65 (04/27/25 2321)    Lines/Drains/Airways       Peripheral Intravenous Line  Duration                  Peripheral IV - Single Lumen 04/27/25 2306 22 G Right Hand 1 day         Peripheral IV - Single Lumen 04/27/25 2316 22 G Left;Anterior Forearm 1 day                     Significant Labs:    CBC/Anemia Profile:  Recent Labs   Lab 04/27/25  2223 04/28/25  0403 04/28/25  0633 04/29/25  0345   WBC 13.96* 11.86  --  22.37*   HGB 12.8 12.3  --  11.5*   HCT 36.9* 36.0* 35* 34.5*    177  --  173   MCV 92 95  --  95   RDW 12.1 12.4  --  12.9        Chemistries:  Recent Labs   Lab 04/27/25 2223 04/28/25  0001 04/28/25  0403 04/29/25  0345     --  134* 138   K 2.6*  --  3.8 3.9     --  110 111*   CO2 14*  --  12* 19*   BUN 7  --  7 11   CREATININE 0.8  --  0.7 0.6   CALCIUM 8.6*  --  8.1* 8.5*   ALBUMIN 4.1  --  3.9  --    BILITOT 0.6  --  0.4  --    ALKPHOS 56  --  53  --    ALT 13  --  14  --    AST 16  --  11  --    GLUCOSE 300*  --  183* 121*   MG 1.8 1.9 2.0 2.4   PHOS  --   --  1.6* 2.5*       All pertinent labs within the past 24 hours have been reviewed.    Significant Imaging:  I have reviewed all pertinent imaging results/findings within the past 24 hours.

## 2025-04-29 NOTE — ASSESSMENT & PLAN NOTE
The patient is a 39-year-old female with a past medical history of asthma who presents with shortness breath. Patient's shortness breath started at midnight last night, and has been progressively worsening. She has some associated chest tightness. She also has a sore throat but does not actually feel sick. Her 2 year old son had a runny nose 2 days ago. She also seasonal allergies - takes loratadine daily. While in the ED, her shortness of breath continued to worsen requiring initiation of Bipap.      - 4/28 - On BIPAP overnight. Off BIPAP in AM. Still with some SOB. Anxiety may be playing component as well. Cont steroids, abx and duonebs. RIP positive for rhinovirus infection.      OK to transfer to floor with no further need for BiPAP (4/29).

## 2025-04-29 NOTE — PROGRESS NOTES
[facility] Intensive Care [facility]  Pulmonology  Progress Note    Patient Name: Gifty Hernandez  MRN: [id number]  Admission Date: 4/27/2025  Hospital Length of Stay: 2 days  Code Status: Full Code  Attending Provider: Juana Khan MD  Primary Care Provider: No, Primary Doctor   Principal Problem: Acute hypoxemic respiratory failure    Subjective:     HPI:  39 year old female with history of asthma that presents to the ED for evaluation of 2 days shortness of breath, chest tightness and wheezing. Patient discloses no cough, nasal congestion, fever or chills. Patient does not smoke, no alcohol or illicit drug use. On presentation patient has low oxygen sat in significant respiratory distress. Labs remarkable for leukocytosis, hypokalemia, elevated lactic acid and blood glucose. Of note, patient has been taking inhale epinephrine at home. CTA chest showed no PE but there is mild patchy ground glass infiltrates of the left upper lobe, pulmonary nodules (13.6 mm) and thyroid nodules. Patient was placed on BiPAP, started on empiric antibiotics, bronchodilators and admitted to ICU. Pulmonary consulted for management.    Overview/Hospital Course:  No notes on file    Interval History:   No acute overnight event.  Patient discloses improving shortness of breath but still wheezing.       Objective:     Vital Signs (Most Recent):  Temp: 98 °F (36.7 °C) (04/29/25 0705)  Pulse: 62 (04/29/25 0800)  Resp: (!) 22 (04/29/25 0800)  BP: 91/67 (04/29/25 0800)  SpO2: 96 % (04/29/25 0800) Vital Signs (24h Range):  Temp:  [98 °F (36.7 °C)-98.9 °F (37.2 °C)] 98 °F (36.7 °C)  Pulse:  [] 62  Resp:  [16-38] 22  SpO2:  [90 %-99 %] 96 %  BP: ()/(50-67) 91/67     Weight: 87.7 kg (193 lb 5.5 oz)  Body mass index is 33.19 kg/m².      Intake/Output Summary (Last 24 hours) at 4/29/2025 0905  Last data filed at 4/29/2025 0638  Gross per 24 hour   Intake 1520.23 ml   Output 3150 ml   Net -1629.77 ml        Physical Exam  Vitals reviewed.    Constitutional:       General: She is not in acute distress.     Appearance: Normal appearance. She is not ill-appearing.   HENT:      Head: Normocephalic and atraumatic.      Mouth/Throat:      Mouth: Mucous membranes are moist.   Eyes:      Conjunctiva/sclera: Conjunctivae normal.   Cardiovascular:      Rate and Rhythm: Normal rate and regular rhythm.      Pulses: Normal pulses.      Heart sounds: No murmur heard.  Pulmonary:      Effort: Pulmonary effort is normal. No respiratory distress.      Breath sounds: Wheezing present.   Musculoskeletal:      Right lower leg: No edema.      Left lower leg: No edema.   Neurological:      General: No focal deficit present.      Mental Status: She is alert and oriented to person, place, and time.   Psychiatric:         Mood and Affect: Mood normal.         Behavior: Behavior normal.           Review of Systems    Vents:  Oxygen Concentration (%): 65 (04/27/25 2321)    Lines/Drains/Airways       Peripheral Intravenous Line  Duration                  Peripheral IV - Single Lumen 04/27/25 2306 22 G Right Hand 1 day         Peripheral IV - Single Lumen 04/27/25 2316 22 G Left;Anterior Forearm 1 day                    Significant Labs:    CBC/Anemia Profile:  Recent Labs   Lab 04/27/25 2223 04/28/25  0403 04/28/25  0633 04/29/25  0345   WBC 13.96* 11.86  --  22.37*   HGB 12.8 12.3  --  11.5*   HCT 36.9* 36.0* 35* 34.5*    177  --  173   MCV 92 95  --  95   RDW 12.1 12.4  --  12.9        Chemistries:  Recent Labs   Lab 04/27/25 2223 04/28/25  0001 04/28/25  0403 04/29/25  0345     --  134* 138   K 2.6*  --  3.8 3.9     --  110 111*   CO2 14*  --  12* 19*   BUN 7  --  7 11   CREATININE 0.8  --  0.7 0.6   CALCIUM 8.6*  --  8.1* 8.5*   ALBUMIN 4.1  --  3.9  --    BILITOT 0.6  --  0.4  --    ALKPHOS 56  --  53  --    ALT 13  --  14  --    AST 16  --  11  --    GLUCOSE 300*  --  183* 121*   MG 1.8 1.9 2.0 2.4   PHOS  --   --  1.6* 2.5*       All pertinent labs  within the past 24 hours have been reviewed.    Significant Imaging:  I have reviewed all pertinent imaging results/findings within the past 24 hours.  Assessment/Plan:   Acute Hypoxemia Respiratory Failure, resolved  Due to asthma exacerbation  Off BiPAP     Severe Asthma Exacerbation due to Rhinovirus  Prednisone 50 mg x 5 days for exacerbation  Duonebs q 4 hours.  Continue Breo daily (please discharge to home based on patient's insurance formulary typically wixela 500/50 per pharmacy)  Complete 5 days of azithromycin  Outpatient pulmonary follow up     .Feeding/fluids: N/A  Analgesia: N/A  Sedation: N/A  Thromboprophylaxis: Enoxaparin  Head up position: Yes  Ulcer prophylaxis: N/A  Glycemic control: Yes  Spontaneous breathing trial: N/A  Bowel care: Yes  Indwelling catheter removal: Peripheral lines  Deescalation of antibiotics: N/A        Patient is stable for stepdown to the floor.      Tej Velasquez MD  Pulmonology  [facility] - Intensive Care ([place])

## 2025-04-29 NOTE — NURSING TRANSFER
Nursing Transfer Note      4/29/2025   3:24 PM    Nurse giving handoff:Huong  Nurse receiving handoff:Muna    Reason patient is being transferred:     Transfer To: 387    Transfer via wheelchair    Transfer with     Transported by transporter    Transfer Vital Signs:  Blood Pressure:  Heart Rate:  O2:  Temperature:  Respirations:    Telemetry:   Order for Tele Monitor? No    Additional Lines:     Medicines sent: N/A    Any special needs or follow-up needed:     Patient belongings transferred with patient: Yes    Chart send with patient: Yes    Notified: spouse    Patient reassessed at:  (date, time)  1  Upon arrival to floor: patient oriented to room, call bell in reach, and bed in lowest position

## 2025-04-29 NOTE — PLAN OF CARE
Problem: Fatigue  Goal: Improved Activity Tolerance  Outcome: Progressing     Problem: Fall Injury Risk  Goal: Absence of Fall and Fall-Related Injury  Outcome: Progressing     Problem: Gas Exchange Impaired  Goal: Optimal Gas Exchange  Outcome: Progressing     Problem: Infection  Goal: Absence of Infection Signs and Symptoms  Outcome: Progressing

## 2025-04-29 NOTE — ASSESSMENT & PLAN NOTE
Patient's most recent potassium results are listed below.   Recent Labs     04/27/25  2223 04/28/25  0403 04/29/25  0345   K 2.6* 3.8 3.9     Plan  - Replete K PRn  - Monitor potassium Daily  - Patient's hypokalemia is unchanged

## 2025-04-30 VITALS
OXYGEN SATURATION: 93 % | TEMPERATURE: 98 F | WEIGHT: 193.31 LBS | SYSTOLIC BLOOD PRESSURE: 112 MMHG | RESPIRATION RATE: 16 BRPM | BODY MASS INDEX: 33 KG/M2 | DIASTOLIC BLOOD PRESSURE: 53 MMHG | HEART RATE: 81 BPM | HEIGHT: 64 IN

## 2025-04-30 LAB
ABSOLUTE EOSINOPHIL (OHS): 0.02 K/UL
ABSOLUTE MONOCYTE (OHS): 0.63 K/UL (ref 0.3–1)
ABSOLUTE NEUTROPHIL COUNT (OHS): 10.94 K/UL (ref 1.8–7.7)
BASOPHILS # BLD AUTO: 0.02 K/UL
BASOPHILS NFR BLD AUTO: 0.1 %
ERYTHROCYTE [DISTWIDTH] IN BLOOD BY AUTOMATED COUNT: 12.4 % (ref 11.5–14.5)
HCT VFR BLD AUTO: 35.4 % (ref 37–48.5)
HGB BLD-MCNC: 11.4 GM/DL (ref 12–16)
IMM GRANULOCYTES # BLD AUTO: 0.13 K/UL (ref 0–0.04)
IMM GRANULOCYTES NFR BLD AUTO: 1 % (ref 0–0.5)
LYMPHOCYTES # BLD AUTO: 1.85 K/UL (ref 1–4.8)
MAGNESIUM SERPL-MCNC: 2.3 MG/DL (ref 1.6–2.6)
MCH RBC QN AUTO: 31.3 PG (ref 27–31)
MCHC RBC AUTO-ENTMCNC: 32.2 G/DL (ref 32–36)
MCV RBC AUTO: 97 FL (ref 82–98)
NUCLEATED RBC (/100WBC) (OHS): 0 /100 WBC
PHOSPHATE SERPL-MCNC: 2.9 MG/DL (ref 2.7–4.5)
PLATELET # BLD AUTO: 175 K/UL (ref 150–450)
PMV BLD AUTO: 10.3 FL (ref 9.2–12.9)
POCT GLUCOSE: 127 MG/DL (ref 70–110)
POCT GLUCOSE: 170 MG/DL (ref 70–110)
POCT GLUCOSE: 99 MG/DL (ref 70–110)
RBC # BLD AUTO: 3.64 M/UL (ref 4–5.4)
RELATIVE EOSINOPHIL (OHS): 0.1 %
RELATIVE LYMPHOCYTE (OHS): 13.6 % (ref 18–48)
RELATIVE MONOCYTE (OHS): 4.6 % (ref 4–15)
RELATIVE NEUTROPHIL (OHS): 80.6 % (ref 38–73)
WBC # BLD AUTO: 13.59 K/UL (ref 3.9–12.7)

## 2025-04-30 PROCEDURE — 94640 AIRWAY INHALATION TREATMENT: CPT

## 2025-04-30 PROCEDURE — 63600175 PHARM REV CODE 636 W HCPCS: Performed by: HOSPITALIST

## 2025-04-30 PROCEDURE — 25000242 PHARM REV CODE 250 ALT 637 W/ HCPCS: Performed by: HOSPITALIST

## 2025-04-30 PROCEDURE — 85025 COMPLETE CBC W/AUTO DIFF WBC: CPT | Performed by: HOSPITALIST

## 2025-04-30 PROCEDURE — 83735 ASSAY OF MAGNESIUM: CPT | Performed by: HOSPITALIST

## 2025-04-30 PROCEDURE — 94761 N-INVAS EAR/PLS OXIMETRY MLT: CPT

## 2025-04-30 PROCEDURE — 84100 ASSAY OF PHOSPHORUS: CPT | Performed by: HOSPITALIST

## 2025-04-30 PROCEDURE — 36415 COLL VENOUS BLD VENIPUNCTURE: CPT | Performed by: HOSPITALIST

## 2025-04-30 PROCEDURE — 25000003 PHARM REV CODE 250: Performed by: HOSPITALIST

## 2025-04-30 RX ORDER — FLUTICASONE PROPIONATE AND SALMETEROL 250; 50 UG/1; UG/1
1 POWDER RESPIRATORY (INHALATION) 2 TIMES DAILY
Qty: 60 EACH | Refills: 0 | Status: SHIPPED | OUTPATIENT
Start: 2025-04-30 | End: 2026-04-30

## 2025-04-30 RX ORDER — INHALER,ASSIST DEVICE,LG MASK
SPACER (EA) MISCELLANEOUS
Qty: 1 EACH | Refills: 0 | Status: SHIPPED | OUTPATIENT
Start: 2025-04-30

## 2025-04-30 RX ORDER — ALBUTEROL SULFATE 90 UG/1
2 INHALANT RESPIRATORY (INHALATION) EVERY 6 HOURS PRN
Qty: 18 G | Refills: 0 | Status: SHIPPED | OUTPATIENT
Start: 2025-04-30 | End: 2026-04-30

## 2025-04-30 RX ORDER — PREDNISONE 50 MG/1
50 TABLET ORAL DAILY
Qty: 2 TABLET | Refills: 0 | Status: SHIPPED | OUTPATIENT
Start: 2025-05-01 | End: 2025-05-02

## 2025-04-30 RX ADMIN — IPRATROPIUM BROMIDE AND ALBUTEROL SULFATE 3 ML: 2.5; .5 SOLUTION RESPIRATORY (INHALATION) at 11:04

## 2025-04-30 RX ADMIN — PREDNISONE 50 MG: 50 TABLET ORAL at 08:04

## 2025-04-30 RX ADMIN — IPRATROPIUM BROMIDE AND ALBUTEROL SULFATE 3 ML: 2.5; .5 SOLUTION RESPIRATORY (INHALATION) at 03:04

## 2025-04-30 RX ADMIN — IPRATROPIUM BROMIDE AND ALBUTEROL SULFATE 3 ML: 2.5; .5 SOLUTION RESPIRATORY (INHALATION) at 08:04

## 2025-04-30 RX ADMIN — AZITHROMYCIN MONOHYDRATE 500 MG: 500 INJECTION, POWDER, LYOPHILIZED, FOR SOLUTION INTRAVENOUS at 08:04

## 2025-04-30 RX ADMIN — CEFTRIAXONE 1 G: 1 INJECTION, POWDER, FOR SOLUTION INTRAMUSCULAR; INTRAVENOUS at 02:04

## 2025-04-30 RX ADMIN — FLUTICASONE FUROATE AND VILANTEROL TRIFENATATE 1 PUFF: 200; 25 POWDER RESPIRATORY (INHALATION) at 09:04

## 2025-04-30 RX ADMIN — CETIRIZINE HYDROCHLORIDE 5 MG: 5 TABLET ORAL at 08:04

## 2025-04-30 RX ADMIN — MUPIROCIN: 20 OINTMENT TOPICAL at 08:04

## 2025-04-30 NOTE — PLAN OF CARE
Problem: Adult Inpatient Plan of Care  Goal: Plan of Care Review  Outcome: Progressing  Goal: Patient-Specific Goal (Individualized)  Outcome: Progressing  Goal: Absence of Hospital-Acquired Illness or Injury  Outcome: Progressing  Goal: Optimal Comfort and Wellbeing  Outcome: Progressing  Goal: Readiness for Transition of Care  Outcome: Progressing     Problem: Fatigue  Goal: Improved Activity Tolerance  Outcome: Progressing     Problem: Fall Injury Risk  Goal: Absence of Fall and Fall-Related Injury  Outcome: Progressing     Problem: Infection  Goal: Absence of Infection Signs and Symptoms  Outcome: Progressing     Discharging home, self-care. Medications filled in outpatient pharmacy.

## 2025-04-30 NOTE — PLAN OF CARE
Case Management Final Discharge Note    Discharge Disposition: Home    New DME ordered / company name: none    Relevant SDOH / Transition of Care Barriers:  none    Person available to provide assistance at home when needed and their contact information:     oWo [employer] (Significant other)  [phone] (Mobile)       Scheduled followup appointment: PCP    Referrals placed: Pulmonology     Transportation: spouse will provide transportation home        Patient and family educated on discharge services and updated on DC plan. Bedside RN notified, patient clear to discharge from Case Management Perspective.     04/30/25 1032   Final Note   Assessment Type Final Discharge Note   Anticipated Discharge Disposition Home   Hospital Resources/Appts/Education Provided Provided patient/caregiver with written discharge plan information;Appointments scheduled and added to AVS   Post-Acute Status   Discharge Delays (!) Medication Delivery     [facility] - Med Surg ([address])  Discharge Final Note    Primary Care Provider: No, Primary Doctor    Expected Discharge Date: 4/30/2025    Final Discharge Note (most recent)       Final Note - 04/30/25 1032          Final Note    Assessment Type Final Discharge Note (P)      Anticipated Discharge Disposition Home or Self Care (P)      Hospital Resources/Appts/Education Provided Provided patient/caregiver with written discharge plan information;Appointments scheduled and added to AVS (P)         Post-Acute Status    Discharge Delays Medication Delivery (P)                      Important Message from Medicare

## 2025-04-30 NOTE — DISCHARGE SUMMARY
[facility] Surg [address] Medicine  Discharge Summary      Patient Name: Gifty Hernandez  MRN: [id number]  [facility]: [id number]  Patient Class: IP- Inpatient  Admission Date: 4/27/2025  Hospital Length of Stay: 3 days  Discharge Date and Time: 04/30/2025 9:48 AM  Attending Physician: Juana Khan MD   Discharging Provider: Juana Khan MD  Primary Care Provider: Winifred, Primary Doctor    Primary Care Team: Networked reference to record PCT     HPI:   HPI by Freeman Ivy NP:  The patient is a 39-year-old female with a past medical history of asthma who presents with shortness of breath. Patient's shortness of breath started at midnight last night, and has been progressively worsening. She has some associated chest tightness. She also has a sore throat but does not actually feel sick. Her 2 year old son had a runny nose 2 days ago. She also seasonal allergies - takes loratadine daily. While in the ED, her shortness of breath continued to worsen requiring initiation of Bipap.  She will be admitted for acute respiratory failure with hypoxia.          Hospital Course:   Patient was admitted to ICU on BiPAP due to asthma exacerbation and viral infection.  She was weaned off BiPAP in the morning but remained short of breath.  She was continued on steroids, empiric antibiotics and respiratory treatments with Duonebs.  Respiratory infection panel was positive for rhinovirus.  Patient's mother disclosed she had run out of her albuterol and had been using Primatene mist (aerosolized epinephrine).    She had a lactic acidosis that resolved with treatment.  She continued to have wheezing on expiration but was stable for transfer to the floor without BiPAP by 4/29.  Oxygen saturation stabilized and she was feeling better on discharge.  She will complete the course of oral steroids at home, and she was prescribed a rescue inhaler with a spacer and Wixela as a preventive inhaler.  Referral to outpatient  pulmonology was sent.  She was seen and examined on the day of discharge.       Goals of Care Treatment Preferences:  Code Status: Full Code       Consults:   Consults (From admission, onward)          Status Ordering Provider     Inpatient consult to Pulmonary Critical Care  Once        Provider:  (Not yet assigned)    Completed SANTIAGO CASEY            Final Active Diagnoses:    Diagnosis Date Noted POA    PRINCIPAL PROBLEM:  Acute hypoxemic respiratory failure [J96.01] 04/28/2025 Yes    Rhinovirus infection [B34.8] 04/29/2025 Yes    Lactic acidosis [E87.20] 04/28/2025 Yes    Moderate persistent asthma with exacerbation [J45.41] 07/15/2021 Yes      Problems Resolved During this Admission:    Diagnosis Date Noted Date Resolved POA    Hypokalemia [E87.6] 04/28/2025 04/29/2025 Yes       Discharged Condition: stable    Disposition: Home or Self Care    Follow Up:    Patient Instructions:      Ambulatory referral/consult to Pulmonology   Standing Status: Future   Referral Priority: Routine Referral Type: Consultation   Referral Reason: Specialty Services Required   Requested Specialty: Pulmonary Disease   Number of Visits Requested: 1     Diet Adult Regular     Activity as tolerated         Medications:  Reconciled Home Medications:      Medication List        START taking these medications      albuterol 90 mcg/actuation inhaler  Commonly known as: VENTOLIN HFA  Inhale 2 puffs into the lungs every 6 (six) hours as needed for Wheezing. Rescue     fluticasone-salmeterol 250-50 mcg/dose 250-50 mcg/dose diskus inhaler  Commonly known as: ADVAIR  Inhale 1 puff into the lungs 2 (two) times daily. Controller     inhalation spacing device  Use as directed for inhalation.     predniSONE 50 MG Tab  Commonly known as: DELTASONE  Take 1 tablet (50 mg total) by mouth once daily.  Start taking on: May 1, 2025              Time spent on the discharge of patient: >30 minutes         Juana Moulton MD  Department of Hospital  [facility] - Med Surg ([address])

## 2025-04-30 NOTE — PLAN OF CARE
Patient was transferred to the floor.    04/30/25 0903   Discharge Reassessment   Assessment Type Discharge Planning Reassessment   Discharge Plan discussed with: Patient   Discharge Plan A Home with family   Discharge Plan B Home with family   DME Needed Upon Discharge  none   Transition of Care Barriers None   Why the patient remains in the hospital Requires continued medical care   Post-Acute Status   Coverage Payor: [place]ICA[employer] [place]   Discharge Delays None known at this time     [facility] - Med Surg ([address])  Discharge Reassessment    Primary Care Provider: Winifred, Primary Doctor    Expected Discharge Date: 4/30/2025    Reassessment (most recent)       Discharge Reassessment - 04/30/25 0903          Discharge Reassessment    Assessment Type Discharge Planning Reassessment (P)      Discharge Plan discussed with: Patient (P)      Discharge Plan A Home with family (P)      Discharge Plan B Home with family (P)      DME Needed Upon Discharge  none (P)      Transition of Care Barriers None (P)      Why the patient remains in the hospital Requires continued medical care (P)         Post-Acute Status    Coverage Payor: MEDICA[place] - (P)      Discharge Delays None known at this time (P)

## 2025-05-01 LAB — BACTERIA SPT CULT: NORMAL

## 2025-05-02 ENCOUNTER — PATIENT OUTREACH (OUTPATIENT)
Dept: ADMINISTRATIVE | Facility: OTHER | Age: 40
End: 2025-05-02
Payer: MEDICAID

## 2025-05-02 ENCOUNTER — OFFICE VISIT (OUTPATIENT)
Dept: PRIMARY CARE CLINIC | Facility: CLINIC | Age: 40
End: 2025-05-02
Payer: MEDICAID

## 2025-05-02 VITALS
HEIGHT: 64 IN | HEART RATE: 74 BPM | TEMPERATURE: 98 F | DIASTOLIC BLOOD PRESSURE: 76 MMHG | OXYGEN SATURATION: 97 % | BODY MASS INDEX: 32.99 KG/M2 | SYSTOLIC BLOOD PRESSURE: 100 MMHG | WEIGHT: 193.25 LBS

## 2025-05-02 DIAGNOSIS — J45.31 MILD PERSISTENT ASTHMA WITH EXACERBATION: Primary | ICD-10-CM

## 2025-05-02 PROCEDURE — 99999 PR PBB SHADOW E&M-EST. PATIENT-LVL III: CPT | Mod: PBBFAC,,,

## 2025-05-02 PROCEDURE — 99213 OFFICE O/P EST LOW 20 MIN: CPT | Mod: PBBFAC,PN

## 2025-05-02 RX ORDER — ALBUTEROL SULFATE 0.83 MG/ML
2.5 SOLUTION RESPIRATORY (INHALATION)
Status: SHIPPED | OUTPATIENT
Start: 2025-05-02

## 2025-05-02 NOTE — PROGRESS NOTES
Transitional Care Note    Family and/or Caretaker present at visit?  No.  Diagnostic tests reviewed/disposition: No diagnosic tests pending after this hospitalization.  Disease/illness education: yes  Home health/community services discussion/referrals: Patient does not have home health established from hospital visit.  They do not need home health.  If needed, we will set up home health for the patient.   Establishment or re-establishment of referral orders for community resources: No other necessary community resources.   Discussion with other health care providers: No discussion with other health care providers necessary.     Subjective:       Patient ID: Gifty Hernandez is a 39 y.o. female.    Chief Complaint: Hospital discharge follow up    Gifty Hernandez is a 39 y.o. year old female  who comes to clinic for hospital discharge follow up    HPI  Hospital Course:   Patient presented to the ER with shortness of breaths. Patient was admitted to ICU on BiPAP due to asthma exacerbation and viral infection.  She was weaned off BiPAP in the morning but remained short of breath.  She was continued on steroids, empiric antibiotics and respiratory treatments with Duonebs.  Respiratory infection panel was positive for rhinovirus.  Patient's mother disclosed she had run out of her albuterol and had been using Primatene mist (aerosolized epinephrine).     She had a lactic acidosis that resolved with treatment.  She continued to have wheezing on expiration but was stable for transfer to the floor without BiPAP by 4/29.  Oxygen saturation stabilized and she was feeling better on discharge.  She will complete the course of oral steroids at home, and she was prescribed a rescue inhaler with a spacer and Wixela as a preventive inhaler.  Referral to outpatient pulmonology was sent.  She was seen and examined on the day of discharge.  Interval history  Patient continues to have wheezing and chest tightness. She completed her 5th day of  "steroids yesterday. She has been using her fluticasone/salmeterol inhaler b.i.d. as prescribed. She has been compliant with this medication. She states she has not been using her albuterol inhaler due to feeling like not needed it.  Patient has wheezing today in clinic and states her chest feels tight.    ROS negative except as above  Objective:      /76 (BP Location: Left arm, Patient Position: Sitting)   Pulse 74   Temp 97.7 °F (36.5 °C) (Oral)   Ht 5' 4" (1.626 m)   Wt 87.6 kg (193 lb 3.6 oz)   LMP 04/04/2025   SpO2 97%   BMI 33.17 kg/m²    Physical Exam  Vitals reviewed.   Constitutional:       Appearance: Normal appearance.   HENT:      Head: Normocephalic.      Mouth/Throat:      Mouth: Mucous membranes are moist.   Cardiovascular:      Rate and Rhythm: Normal rate and regular rhythm.      Pulses: Normal pulses.      Heart sounds: Normal heart sounds.   Pulmonary:      Effort: Pulmonary effort is normal.      Breath sounds: Wheezing (Bilateral, that improved after albuterol nebulization in clinic) present. No rhonchi.   Abdominal:      General: Abdomen is flat. There is no distension.   Musculoskeletal:         General: No swelling. Normal range of motion.      Cervical back: Normal range of motion.   Skin:     General: Skin is warm.      Coloration: Skin is not jaundiced.   Neurological:      Mental Status: She is alert.         Assessment:       1. Mild persistent asthma with exacerbation        Plan:       1. Mild persistent asthma with exacerbation (Primary)  Condition still uncontrolled. She finished her 5th day of steroids. Continue using fluticasone-salmeterol inhaler. Tolerating medication well.  Counseled on the indications of albuterol inhaler.  Patient with still wheezing and feelings of chest tightness today. Patient received nebulizer treatment with improvement of symptoms. Patient will come back in 2 weeks for follow up of asthma.  - albuterol nebulizer solution 2.5 mg    Follow up " in about 2 weeks (around 5/16/2025) for asthma follow up .      Mc Fairchild M.D.    This note was generated with the assistance of ambient listening technology. Verbal consent was obtained by the patient and accompanying visitor(s) for the recording of patient appointment to facilitate this note. I attest to having reviewed and edited the generated note for accuracy, though some syntax or spelling errors may persist. Please contact the author of this note for any clarification.

## 2025-05-02 NOTE — PROGRESS NOTES
[facility] - Initial Contact    This Community Health Worker completed the Social Determinant of Health questionnaire with patient during clinic visit today.    Pt identified barriers of most importance are: NONE   Referrals to community agencies completed with patient/caregiver consent outside of [facility] include: NONE  Referrals were put through [facility] - : NO  Support and Services: No support & services have been documented.  Other information discussed the patient needs / wants help with: NONE   Follow up required: NO  No future outreach task assigned

## 2025-05-03 LAB
BACTERIA BLD CULT: NORMAL
BACTERIA BLD CULT: NORMAL

## 2025-05-14 ENCOUNTER — TELEPHONE (OUTPATIENT)
Dept: ADMINISTRATIVE | Facility: OTHER | Age: 40
End: 2025-05-14
Payer: MEDICAID

## 2025-05-14 NOTE — TELEPHONE ENCOUNTER
[facility] - Attempted to call patient and confirm appt scheduled for 5/16/25. LVM with instructions to call [facility].   
Former smoker

## 2025-05-16 ENCOUNTER — OFFICE VISIT (OUTPATIENT)
Dept: PRIMARY CARE CLINIC | Facility: CLINIC | Age: 40
End: 2025-05-16
Payer: MEDICAID

## 2025-05-16 VITALS
OXYGEN SATURATION: 97 % | TEMPERATURE: 98 F | HEART RATE: 68 BPM | DIASTOLIC BLOOD PRESSURE: 64 MMHG | WEIGHT: 197.75 LBS | SYSTOLIC BLOOD PRESSURE: 101 MMHG | HEIGHT: 64 IN | BODY MASS INDEX: 33.76 KG/M2

## 2025-05-16 DIAGNOSIS — J45.21 MILD INTERMITTENT ASTHMA WITH EXACERBATION: Primary | ICD-10-CM

## 2025-05-16 PROCEDURE — 3074F SYST BP LT 130 MM HG: CPT | Mod: CPTII,,,

## 2025-05-16 PROCEDURE — 1111F DSCHRG MED/CURRENT MED MERGE: CPT | Mod: CPTII,,,

## 2025-05-16 PROCEDURE — 99213 OFFICE O/P EST LOW 20 MIN: CPT | Mod: S$PBB,,,

## 2025-05-16 PROCEDURE — 99213 OFFICE O/P EST LOW 20 MIN: CPT | Mod: PBBFAC,PN

## 2025-05-16 PROCEDURE — 3044F HG A1C LEVEL LT 7.0%: CPT | Mod: CPTII,,,

## 2025-05-16 PROCEDURE — 3008F BODY MASS INDEX DOCD: CPT | Mod: CPTII,,,

## 2025-05-16 PROCEDURE — 99999 PR PBB SHADOW E&M-EST. PATIENT-LVL III: CPT | Mod: PBBFAC,,,

## 2025-05-16 PROCEDURE — 1160F RVW MEDS BY RX/DR IN RCRD: CPT | Mod: CPTII,,,

## 2025-05-16 PROCEDURE — G2211 COMPLEX E/M VISIT ADD ON: HCPCS | Mod: S$PBB,,,

## 2025-05-16 PROCEDURE — 3078F DIAST BP <80 MM HG: CPT | Mod: CPTII,,,

## 2025-05-16 PROCEDURE — 1159F MED LIST DOCD IN RCRD: CPT | Mod: CPTII,,,

## 2025-05-16 RX ORDER — FLUTICASONE PROPIONATE AND SALMETEROL 250; 50 UG/1; UG/1
1 POWDER RESPIRATORY (INHALATION) 2 TIMES DAILY
Qty: 60 EACH | Refills: 5 | Status: SHIPPED | OUTPATIENT
Start: 2025-05-16 | End: 2025-11-12

## 2025-05-16 RX ORDER — ALBUTEROL SULFATE 90 UG/1
2 INHALANT RESPIRATORY (INHALATION) EVERY 6 HOURS PRN
Qty: 18 G | Refills: 2 | Status: SHIPPED | OUTPATIENT
Start: 2025-05-16 | End: 2026-05-16

## 2025-05-17 NOTE — PROGRESS NOTES
"Subjective:       Patient ID: Gifty Hernandez is a 39 y.o. female.    Chief Complaint: asthma follow up   HPI      Patient came to clinic 2 weeks ago after hospital discharge following asthma exacerbation.  At that time she was still wheezing, present treatment done improved her symptoms. Counseled patient on adequate use of controlled medication as well was rescue inhaler.  Current she states her symptoms have significantly improved.  She is still using her controlling medication twice a day as prescribed.  She reports using albuterol inhaler twice per week for the past 2 weeks, She monitors with peak flow meter daily.      ROS negative except as above  Objective:      /64 (BP Location: Left arm, Patient Position: Sitting)   Pulse 68   Temp 97.7 °F (36.5 °C) (Oral)   Ht 5' 4" (1.626 m)   Wt 89.7 kg (197 lb 12 oz)   LMP 05/05/2025   SpO2 97%   BMI 33.94 kg/m²    Physical Exam  Vitals reviewed.   Constitutional:       Appearance: Normal appearance.   HENT:      Head: Normocephalic.      Mouth/Throat:      Mouth: Mucous membranes are moist.   Cardiovascular:      Rate and Rhythm: Normal rate and regular rhythm.      Pulses: Normal pulses.      Heart sounds: Normal heart sounds.   Pulmonary:      Effort: Pulmonary effort is normal.      Breath sounds: Normal breath sounds. No wheezing or rhonchi.   Abdominal:      General: Abdomen is flat. There is no distension.   Musculoskeletal:         General: No swelling. Normal range of motion.      Cervical back: Normal range of motion.   Skin:     General: Skin is warm.      Coloration: Skin is not jaundiced.   Neurological:      Mental Status: She is alert.         Assessment:       1. Mild intermittent asthma with exacerbation          Plan:       1. Mild intermittent asthma with exacerbation (Primary)  Condition significantly improved since last visit. Patient has been using her rescue inhaler twice per week.  Patient is going out of the city for vacation, we will " refill medication.  Patient sees [facility] for primary care, states she will continue her care there.  - fluticasone-salmeterol diskus inhaler 250-50 mcg; Inhale 1 puff into the lungs 2 (two) times daily. Controller  Dispense: 60 each; Refill: 5  - albuterol (VENTOLIN HFA) 90 mcg/actuation inhaler; Inhale 2 puffs into the lungs every 6 (six) hours as needed for Wheezing. Rescue  Dispense: 18 g; Refill: 2        Follow up in about 6 months (around 11/16/2025) for asthma follow up .      Mc Fairchild M.D.    This note was generated with the assistance of ambient listening technology. Verbal consent was obtained by the patient and accompanying visitor(s) for the recording of patient appointment to facilitate this note. I attest to having reviewed and edited the generated note for accuracy, though some syntax or spelling errors may persist. Please contact the author of this note for any clarification.

## 2025-07-14 ENCOUNTER — OFFICE VISIT (OUTPATIENT)
Dept: PULMONOLOGY | Facility: CLINIC | Age: 40
End: 2025-07-14
Payer: MEDICAID

## 2025-07-14 ENCOUNTER — LAB VISIT (OUTPATIENT)
Dept: LAB | Facility: HOSPITAL | Age: 40
End: 2025-07-14
Attending: INTERNAL MEDICINE
Payer: MEDICAID

## 2025-07-14 VITALS
SYSTOLIC BLOOD PRESSURE: 109 MMHG | DIASTOLIC BLOOD PRESSURE: 74 MMHG | BODY MASS INDEX: 34.7 KG/M2 | HEART RATE: 67 BPM | WEIGHT: 202.19 LBS | OXYGEN SATURATION: 98 %

## 2025-07-14 DIAGNOSIS — R91.8 LUNG NODULES: ICD-10-CM

## 2025-07-14 DIAGNOSIS — J45.21 MILD INTERMITTENT ASTHMA WITH EXACERBATION: ICD-10-CM

## 2025-07-14 DIAGNOSIS — J45.41 MODERATE PERSISTENT ASTHMA WITH EXACERBATION: ICD-10-CM

## 2025-07-14 DIAGNOSIS — J45.41 MODERATE PERSISTENT ASTHMA WITH EXACERBATION: Primary | ICD-10-CM

## 2025-07-14 DIAGNOSIS — B34.8 RHINOVIRUS INFECTION: ICD-10-CM

## 2025-07-14 LAB
ABSOLUTE EOSINOPHIL (OHS): 0.41 K/UL
ABSOLUTE MONOCYTE (OHS): 0.45 K/UL (ref 0.3–1)
ABSOLUTE NEUTROPHIL COUNT (OHS): 5.46 K/UL (ref 1.8–7.7)
BASOPHILS # BLD AUTO: 0.04 K/UL
BASOPHILS NFR BLD AUTO: 0.4 %
ERYTHROCYTE [DISTWIDTH] IN BLOOD BY AUTOMATED COUNT: 12 % (ref 11.5–14.5)
HCT VFR BLD AUTO: 39.6 % (ref 37–48.5)
HGB BLD-MCNC: 13.4 GM/DL (ref 12–16)
IMM GRANULOCYTES # BLD AUTO: 0.05 K/UL (ref 0–0.04)
IMM GRANULOCYTES NFR BLD AUTO: 0.5 % (ref 0–0.5)
LYMPHOCYTES # BLD AUTO: 2.76 K/UL (ref 1–4.8)
MCH RBC QN AUTO: 31.5 PG (ref 27–31)
MCHC RBC AUTO-ENTMCNC: 33.8 G/DL (ref 32–36)
MCV RBC AUTO: 93 FL (ref 82–98)
NUCLEATED RBC (/100WBC) (OHS): 0 /100 WBC
PLATELET # BLD AUTO: 230 K/UL (ref 150–450)
PMV BLD AUTO: 9.8 FL (ref 9.2–12.9)
RBC # BLD AUTO: 4.26 M/UL (ref 4–5.4)
RELATIVE EOSINOPHIL (OHS): 4.5 %
RELATIVE LYMPHOCYTE (OHS): 30.1 % (ref 18–48)
RELATIVE MONOCYTE (OHS): 4.9 % (ref 4–15)
RELATIVE NEUTROPHIL (OHS): 59.6 % (ref 38–73)
WBC # BLD AUTO: 9.17 K/UL (ref 3.9–12.7)

## 2025-07-14 PROCEDURE — 86003 ALLG SPEC IGE CRUDE XTRC EA: CPT | Mod: 59

## 2025-07-14 PROCEDURE — 86003 ALLG SPEC IGE CRUDE XTRC EA: CPT

## 2025-07-14 PROCEDURE — 99213 OFFICE O/P EST LOW 20 MIN: CPT | Mod: PBBFAC | Performed by: INTERNAL MEDICINE

## 2025-07-14 PROCEDURE — 85025 COMPLETE CBC W/AUTO DIFF WBC: CPT

## 2025-07-14 PROCEDURE — 99999 PR PBB SHADOW E&M-EST. PATIENT-LVL III: CPT | Mod: PBBFAC,,, | Performed by: INTERNAL MEDICINE

## 2025-07-14 PROCEDURE — 36415 COLL VENOUS BLD VENIPUNCTURE: CPT

## 2025-07-14 PROCEDURE — 82570 ASSAY OF URINE CREATININE: CPT

## 2025-07-14 RX ORDER — ALBUTEROL SULFATE 0.83 MG/ML
2.5 SOLUTION RESPIRATORY (INHALATION) EVERY 6 HOURS PRN
Qty: 270 ML | Refills: 11 | Status: SHIPPED | OUTPATIENT
Start: 2025-07-14 | End: 2026-07-14

## 2025-07-14 RX ORDER — ALBUTEROL SULFATE 90 UG/1
2 INHALANT RESPIRATORY (INHALATION) EVERY 6 HOURS PRN
Qty: 18 G | Refills: 2 | Status: SHIPPED | OUTPATIENT
Start: 2025-07-14 | End: 2026-07-14

## 2025-07-14 RX ORDER — FLUTICASONE PROPIONATE AND SALMETEROL 250; 50 UG/1; UG/1
1 POWDER RESPIRATORY (INHALATION) 2 TIMES DAILY
Qty: 60 EACH | Refills: 5 | Status: SHIPPED | OUTPATIENT
Start: 2025-07-14 | End: 2026-01-10

## 2025-07-14 NOTE — PROGRESS NOTES
General Pulmonary Clinic  New Patient Visit    Chief Complaint: asthma     HPI     Gifty Hernandez is a 39 y.o. female with gastric ulcer, presenting with chief complaint of asthma      Asthma  # severe persistent asthma    Diagnosis: never formally dx -- she was an adult at first onset of symptoms, following xcovid  Symptoms: shortness of breath [], exercise tolerance of  blocks  stairs,  cough []- ,  wheezing[x], rhinorrhea [],  post nasal drip[x]  eczema[], nasal polyps[]  Current regimen: advair   KARLA use:  rare   Exacerbations requiring prednisone: 1 per year, last exacerbation 4/202  Discharge from 4/30/25 reviewed - presented w/ shortness of breath w/ URI, she was found to have hypoxia with no hypercapnia. CT chest w/ significant motion artifact but with RUL apical/pleural nodular opacity 1.3 cm and 4-5 mm nodule. 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 She has possible cat allergy, suspected after adopting a pregnant stray cat and experiencing a potential allergic reaction following exposure to the cat and her kittens. She reports severe seasonal allergies, particularly in spring, which are debilitating and significant enough that she sometimes requires her partner to stay home from work to help care for their two-year-old child. She takes daily allergy medication but is uncertain about its effectiveness and has never undergone formal allergy testing.    MEDICAL HISTORY:  She has a history of stomach ulcer following childbirth, reportedly  related to ibuprofen use after a difficult delivery with no current ongoing symptoms. She was diagnosed with carpal tunnel syndrome associated with previous computer-intensive work. She had gestational diabetes during previous pregnancy. She has had significant weight gain of approximately 40 lbs over the past two years.        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 - no dust smoke fumes or chemicals          Objective   Past History     Past Medical History:  Past Medical History:   Diagnosis Date    Gestational diabetes          Past Surgical History:  Past Surgical History:   Procedure Laterality Date    BUNIONECTOMY Right 2011         Social History:   Social History     Socioeconomic History    Marital status: Single   Tobacco Use    Smoking status: Never    Smokeless tobacco: Never   Substance and Sexual Activity    Alcohol use: Not Currently    Drug use: Never    Sexual activity: Not Currently     Partners: Male     Birth control/protection: None     Social Drivers of Health     Financial Resource Strain: Low Risk  (5/2/2025)    Overall Financial Resource Strain (CARDIA)     Difficulty of Paying Living Expenses: Not hard at all   Food Insecurity: No Food Insecurity (5/2/2025)    Hunger Vital Sign     Worried About Running Out of Food in the Last Year: Never true     Ran Out of Food in the Last Year: Never true   Transportation Needs: No Transportation Needs (5/2/2025)    PRAPARE - Transportation     Lack of Transportation (Medical): No     Lack of Transportation (Non-Medical): No   Physical Activity: Sufficiently Active (5/2/2025)    Exercise Vital Sign     Days of Exercise per Week: 6 days     Minutes of Exercise per Session: 40 min   Stress: No Stress Concern Present (5/2/2025)    [employer] - Occupational Stress Questionnaire     Feeling of Stress : Only a little   Housing  "Stability: Low Risk  (5/2/2025)    Housing Stability Vital Sign     Unable to Pay for Housing in the Last Year: No     Number of Times Moved in the Last Year: 1     Homeless in the Last Year: No         Family Hx:  Grandmother - emphysema  Matertnal aunt -asthma,  no family history of lung cancer or lymphoma    Allergies and Pertinent Medications   Allergies and Medications Reviewed    Penicillins  [unfilled]             Physical Exam   /74   Pulse 67   Wt 91.7 kg (202 lb 2.6 oz)   SpO2 98%   BMI 34.70 kg/m²       Constitutional: No acute distress, Atraumatic   HEENT: moist mucus membranes, extraocular movements intact  Cardiovascular: regular rate and rhythm, no murmurs, rubs or gallops  Pulmonary: normal respiratory rate and chest rise, no chest wall deformity, normal breath sounds with no wheezing or crackles  Abdominal: non-distended, bowel sounds present  Musculoskeletal: No lower extremity edema, no clubbing  Neurological: normal speech/lisa, moves all extremities against gravity  Skin: no finger cyanosis, no rashes on exposed body parts  Psych: Appropriate affect, normal mood       Diagnostic Studies      All diagnostic studies relevant to chief complaint reviewed personally    Labs:  Lab Results   Component Value Date    WBC 13.59 (H) 04/30/2025    HGB 11.4 (L) 04/30/2025    HGB 10.4 (L) 02/27/2023     04/30/2025    PLT 96 (L) 02/27/2023    MCV 97 04/30/2025     (H) 02/27/2023     Lab Results   Component Value Date     04/29/2025    K 3.9 04/29/2025    CO2 19 (L) 04/29/2025    BUN 11 04/29/2025    CREATININE 0.6 04/29/2025    MG 2.3 04/30/2025     Lab Results   Component Value Date    AST 11 04/28/2025    ALT 14 04/28/2025    ALBUMIN 3.9 04/28/2025    PROT 7.2 04/28/2025    BILITOT 0.4 04/28/2025         PFTs:  Pulmonary Functions Testing Results:  No results found for: "FEV1", "FVC", "RQJ9QDT", "TLC", "DLCO"      FVC FEV1 FEV/FVC TLC DLCO 6MWT Interpret                    "                                      TTE:  No results found for this or any previous visit.            Assessment and Plan   Gifty Hernandez is a 39 y.o. female  presenting with chief complaint of asthma    Problem List:  # moderate persistent allergic asthma - chronic, stable - severe exacerbation in 2025 requiring hospitalization. Suspect allergic asthma based on  long-standing allergy history. Considered differential diagnoses including sarcoidosis given history of carpal tunnel and gastric ulcer and acute worsening after covid  # pulmonary nodules    - Continue advair 500 1 puff twice daily (morning and night).  - PFTs  - Ordered nebulizer machine through [facility] ??? for home use in emergencies.  - Recommend working on weight loss to potentially improve breathing.  - AEC and aeroallergen  - Ordered urine test to check calcium levels.  - Ordered CT Lungs to follow up on previously noted spots.  - Gifty to be mindful of any acid reflux symptoms and report if they occur.  - consider sleep study    - Follow up on August 25th at 5:00 PM for telehealth appointment.        Explained to the patient I work Monday-Thursdays, so any results or messages received after working hours Thursday through Monday AM would not be addressed until I was back in the office. If he/she experienced any acute symptoms he/she should go the emergency room for immediate help.    Differential, diagnoses, diagnostic work up, and possible treatments were discussed with the patient. Questions were answered.    Rhonda Bower MD  Pulmonary-Critical Care Medicine              For this visit, the following time was spent  preparing to see the patient (e.g., review of tests) 15 minutes  obtaining and/or reviewing separately obtained history 0 minutes  Performing a medically necessary appropriate examination and/or evaluation 14 minutes  Counseling and educating the patient/family/caregiver 16 minutes  Ordering medications, tests, or procedures 2  minutes  Referring and communicating with other health care professionals (when not reported separately) 0minutes  Documenting clinical information in the electronic or other health record 6 minutes  Care coordination (not reported separately) 0minutes    Total time = 53 minutes

## 2025-07-15 LAB
CALCIUM UR-MCNC: 13.5 MG/DL
CALCIUM/CREATININE RATIO, URINE (OHS): 0.12
CREAT UR-MCNC: 115 MG/DL (ref 15–325)

## 2025-07-23 ENCOUNTER — PATIENT MESSAGE (OUTPATIENT)
Dept: PULMONOLOGY | Facility: CLINIC | Age: 40
End: 2025-07-23
Payer: MEDICAID

## 2025-07-23 DIAGNOSIS — J45.41 MODERATE PERSISTENT ASTHMA WITH EXACERBATION: Primary | ICD-10-CM

## 2025-08-14 ENCOUNTER — HOSPITAL ENCOUNTER (OUTPATIENT)
Dept: RADIOLOGY | Facility: HOSPITAL | Age: 40
Discharge: HOME | End: 2025-08-14
Attending: INTERNAL MEDICINE
Payer: MEDICAID

## 2025-08-14 ENCOUNTER — HOSPITAL ENCOUNTER (OUTPATIENT)
Dept: PULMONOLOGY | Facility: CLINIC | Age: 40
Discharge: HOME | End: 2025-08-14
Payer: MEDICAID

## 2025-08-14 DIAGNOSIS — R91.8 LUNG NODULES: ICD-10-CM

## 2025-08-14 DIAGNOSIS — J45.41 MODERATE PERSISTENT ASTHMA WITH EXACERBATION: ICD-10-CM

## 2025-08-14 LAB
DLCO ADJ PRE: 25.54 ML/(MIN*MMHG) (ref 20.09–31.55)
DLCO SINGLE BREATH LLN: 20.09
DLCO SINGLE BREATH PRE REF: 98.9 %
DLCO SINGLE BREATH REF: 25.82
DLCOC SBVA LLN: 3.66
DLCOC SBVA PRE REF: 89.9 %
DLCOC SBVA REF: 5.23
DLCOC SINGLE BREATH LLN: 20.09
DLCOC SINGLE BREATH PRE REF: 98.9 %
DLCOC SINGLE BREATH REF: 25.82
DLCOCSBVAULN: 6.79
DLCOCSINGLEBREATHULN: 31.55
DLCOCSINGLEBREATHZSCORE: -0.08
DLCOSINGLEBREATHULN: 31.55
DLCOSINGLEBREATHZSCORE: -0.08
DLCOVA LLN: 3.66
DLCOVA PRE REF: 89.9 %
DLCOVA PRE: 4.7 ML/(MIN*MMHG*L) (ref 3.66–6.79)
DLCOVA REF: 5.23
DLCOVAULN: 6.79
DLVAADJ PRE: 4.7 ML/(MIN*MMHG*L) (ref 3.66–6.79)
ERV LLN: -16448.89
ERV PRE REF: 77.2 %
ERV REF: 1.11
ERVULN: ABNORMAL
FEF 25 75 LLN: 1.94
FEF 25 75 PRE REF: 75.6 %
FEF 25 75 REF: 3.19
FET100 CHG: 5.3 %
FEV05 LLN: 1.28
FEV05 REF: 2.14
FEV1 CHG: 5.4 %
FEV1 FVC LLN: 71
FEV1 FVC PRE REF: 90.9 %
FEV1 FVC REF: 82
FEV1 LLN: 2.41
FEV1 PRE REF: 104.2 %
FEV1 REF: 3.02
FEV1 VOL CHG: 0.17
FRCPLETH LLN: 1.86
FRCPLETH PREREF: 94 %
FRCPLETH REF: 2.68
FRCPLETHULN: 3.5
FVC CHG: 2.9 %
FVC LLN: 2.94
FVC PRE REF: 114.1 %
FVC REF: 3.69
FVC VOL CHG: 0.12
IVC PRE: 4.12 L (ref 2.94–4.46)
IVC SINGLE BREATH LLN: 2.94
IVC SINGLE BREATH PRE REF: 111.7 %
IVC SINGLE BREATH REF: 3.69
IVCSINGLEBREATHULN: 4.46
LLN IC: -16447.75
PEF LLN: 5.27
PEF PRE REF: 120.8 %
PEF REF: 6.98
POST FEF 25 75: 2.77 L/S (ref 1.94–4.7)
POST FET 100: 7.78 SEC
POST FEV1 FVC: 76.68 % (ref 71.35–91.45)
POST FEV1: 3.32 L (ref 2.41–3.62)
POST FEV5: 2.58 L (ref 1.28–2.99)
POST FVC: 4.33 L (ref 2.94–4.46)
POST PEF: 8.47 L/S (ref 5.27–8.69)
PRE DLCO: 25.54 ML/(MIN*MMHG) (ref 20.09–31.55)
PRE ERV: 0.86 L (ref -16448.89–16451.11)
PRE FEF 25 75: 2.41 L/S (ref 1.94–4.7)
PRE FET 100: 7.39 SEC
PRE FEV05 REF: 114.5 %
PRE FEV1 FVC: 74.89 % (ref 71.35–91.45)
PRE FEV1: 3.15 L (ref 2.41–3.62)
PRE FEV5: 2.45 L (ref 1.28–2.99)
PRE FRC PL: 2.52 L (ref 1.86–3.5)
PRE FVC: 4.21 L (ref 2.94–4.46)
PRE IC: 3.35 L (ref -16447.75–16452.25)
PRE PEF: 8.43 L/S (ref 5.27–8.69)
PRE REF IC: 149 %
PRE RV: 1.66 L (ref 0.99–2.14)
PRE TLC: 5.86 L (ref 3.95–5.93)
RAW PRE REF: 145.2 %
RAW PRE: 4.44 CMH2O*S/L (ref 3.06–3.06)
RAW REF: 3.06
REF IC: 2.25
RV LLN: 0.99
RV PRE REF: 106 %
RV REF: 1.57
RVTLC LLN: 23
RVTLC PRE REF: 87.8 %
RVTLC PRE: 28.3 % (ref 22.63–41.81)
RVTLC REF: 32
RVTLCULN: 42
RVULN: 2.14
SGAW PRE REF: 65.9 %
SGAW PRE: 0.07 1/(CMH2O*S) (ref 0.1–0.1)
SGAW REF: 0.1
TLC LLN: 3.95
TLC PRE REF: 118.7 %
TLC REF: 4.94
TLC ULN: 5.93
ULN IC: ABNORMAL
VA PRE: 5.43 L (ref 4.79–4.79)
VA SINGLE BREATH LLN: 4.79
VA SINGLE BREATH PRE REF: 113.5 %
VA SINGLE BREATH REF: 4.79
VASINGLEBREATHULN: 4.79
VC LLN: 2.94
VC PRE REF: 114.1 %
VC PRE: 4.21 L (ref 2.94–4.46)
VC REF: 3.69
VC ULN: 4.46

## 2025-08-14 PROCEDURE — 71250 CT THORAX DX C-: CPT | Mod: TC

## 2025-08-14 PROCEDURE — 71250 CT THORAX DX C-: CPT | Mod: 26,,, | Performed by: RADIOLOGY

## 2025-08-14 PROCEDURE — 94726 PLETHYSMOGRAPHY LUNG VOLUMES: CPT | Mod: PBBFAC | Performed by: INTERNAL MEDICINE

## 2025-08-14 PROCEDURE — 94729 DIFFUSING CAPACITY: CPT | Mod: PBBFAC | Performed by: INTERNAL MEDICINE

## 2025-08-14 PROCEDURE — 94060 EVALUATION OF WHEEZING: CPT | Mod: PBBFAC | Performed by: INTERNAL MEDICINE

## 2025-08-18 ENCOUNTER — TELEPHONE (OUTPATIENT)
Dept: PULMONOLOGY | Facility: CLINIC | Age: 40
End: 2025-08-18
Payer: MEDICAID

## 2025-08-18 ENCOUNTER — PATIENT MESSAGE (OUTPATIENT)
Dept: PULMONOLOGY | Facility: CLINIC | Age: 40
End: 2025-08-18
Payer: MEDICAID

## 2025-08-18 DIAGNOSIS — J21.9 BRONCHIOLITIS: Primary | ICD-10-CM
